# Patient Record
Sex: FEMALE | Race: WHITE | Employment: FULL TIME | URBAN - METROPOLITAN AREA
[De-identification: names, ages, dates, MRNs, and addresses within clinical notes are randomized per-mention and may not be internally consistent; named-entity substitution may affect disease eponyms.]

---

## 2019-05-04 ENCOUNTER — LABORATORY ENCOUNTER (OUTPATIENT)
Dept: LAB | Age: 49
End: 2019-05-04
Attending: FAMILY MEDICINE
Payer: COMMERCIAL

## 2019-05-04 ENCOUNTER — OFFICE VISIT (OUTPATIENT)
Dept: FAMILY MEDICINE CLINIC | Facility: CLINIC | Age: 49
End: 2019-05-04
Payer: COMMERCIAL

## 2019-05-04 VITALS
HEART RATE: 72 BPM | RESPIRATION RATE: 16 BRPM | HEIGHT: 62 IN | DIASTOLIC BLOOD PRESSURE: 90 MMHG | SYSTOLIC BLOOD PRESSURE: 160 MMHG | TEMPERATURE: 99 F | BODY MASS INDEX: 29.9 KG/M2 | WEIGHT: 162.5 LBS

## 2019-05-04 DIAGNOSIS — Z00.00 ANNUAL PHYSICAL EXAM: ICD-10-CM

## 2019-05-04 DIAGNOSIS — Z00.00 ANNUAL PHYSICAL EXAM: Primary | ICD-10-CM

## 2019-05-04 DIAGNOSIS — R73.03 PREDIABETES: ICD-10-CM

## 2019-05-04 DIAGNOSIS — Z13.31 NEGATIVE DEPRESSION SCREENING: ICD-10-CM

## 2019-05-04 DIAGNOSIS — Z12.39 BREAST CANCER SCREENING: ICD-10-CM

## 2019-05-04 DIAGNOSIS — Z23 NEED FOR VACCINATION: ICD-10-CM

## 2019-05-04 DIAGNOSIS — I10 ESSENTIAL HYPERTENSION: ICD-10-CM

## 2019-05-04 DIAGNOSIS — M79.7 FIBROMYALGIA: ICD-10-CM

## 2019-05-04 PROCEDURE — 80053 COMPREHEN METABOLIC PANEL: CPT

## 2019-05-04 PROCEDURE — 90715 TDAP VACCINE 7 YRS/> IM: CPT | Performed by: FAMILY MEDICINE

## 2019-05-04 PROCEDURE — 83036 HEMOGLOBIN GLYCOSYLATED A1C: CPT

## 2019-05-04 PROCEDURE — 85025 COMPLETE CBC W/AUTO DIFF WBC: CPT

## 2019-05-04 PROCEDURE — 99386 PREV VISIT NEW AGE 40-64: CPT | Performed by: FAMILY MEDICINE

## 2019-05-04 PROCEDURE — 36415 COLL VENOUS BLD VENIPUNCTURE: CPT

## 2019-05-04 PROCEDURE — 90472 IMMUNIZATION ADMIN EACH ADD: CPT | Performed by: FAMILY MEDICINE

## 2019-05-04 PROCEDURE — 84443 ASSAY THYROID STIM HORMONE: CPT

## 2019-05-04 PROCEDURE — 80061 LIPID PANEL: CPT

## 2019-05-04 PROCEDURE — 99213 OFFICE O/P EST LOW 20 MIN: CPT | Performed by: FAMILY MEDICINE

## 2019-05-04 PROCEDURE — 90471 IMMUNIZATION ADMIN: CPT | Performed by: FAMILY MEDICINE

## 2019-05-04 RX ORDER — PREGABALIN 150 MG/1
150 CAPSULE ORAL 2 TIMES DAILY
Qty: 180 CAPSULE | Refills: 3 | Status: SHIPPED | OUTPATIENT
Start: 2019-05-04 | End: 2019-12-21

## 2019-05-04 RX ORDER — LISINOPRIL 10 MG/1
10 TABLET ORAL DAILY
Qty: 30 TABLET | Refills: 1 | Status: SHIPPED | OUTPATIENT
Start: 2019-05-04 | End: 2019-06-01

## 2019-05-04 RX ORDER — PREGABALIN 150 MG/1
150 CAPSULE ORAL 2 TIMES DAILY
COMMUNITY
End: 2019-05-04

## 2019-05-04 NOTE — PATIENT INSTRUCTIONS
Thank you for allowing me to participate in your care today. I will contact you with any results from today's visit. Lab results are typically available in 2-3 days for blood tests, and 3-5 days for any cultures or Paps.    Please let me know if you hav instead of salt when you cook. ¨ Request no added salt when you go to a restaurant.   ¨ The American Heart Association (AHA) says the \"ideal\" amount of sodium is no more than 1,500 mg a day.  But because Americans eat so much salt, you can make a [place] [facility] [place] [address]. All rights reserved. This information is not intended as a substitute for professional medical care. Always follow your healthcare professional's instructions.           Prevention Guidelines, Women Ages 36 to 52  Scre every 3 years or Pap test plus human papilloma virus (HPV) test every 5 years   Chlamydia Women at increased risk for infection At routine exams if you're at risk or have symptoms   Depression All women in this age group At routine exams   Gonorrhea Sexual rubella (MMR) All women in this age group who have no record of these infections or vaccines 1 or 2 doses   Meningococcal Women at increased risk for infection–talk with your healthcare provider 1 or more doses   Pneumococcal conjugate vaccine (PCV13) and

## 2019-05-04 NOTE — PROGRESS NOTES
Chantel Pinto is a 51year old female that presents for annual physical exam.     Last Pap: 2018 with Dr. Tamika Swift in [facility], recommended every 3 years  Hx of abnormal pap: No  STI testing desired: No  Mammogram: 2016?  [facility]  Colonoscopy: N/A  PHQ2: Not on file      Number of children: 3      Years of education: Not on file      Highest education level: Not on file    Occupational History      Not on file    Social Needs      Financial resource strain: Not on file      Food insecurity:        Worry: N vision  HEENT: denies nasal congestion, sinus pain or ST  LUNGS: denies shortness of breath with exertion  CARDIOVASCULAR: denies chest pain on exertion  GI: denies abdominal pain, denies heartburn  : denies dysuria, vaginal discharge or itching, periods Breast cancer screening  - [facility] SARAH 2D+3D SCREENING BILAT (CPT=77067/33056); Future    4.  Need for vaccination  - IMMUNIZATION ADMINISTRATION  - EACH ADDITIONAL VACCINE  - TETANUS, DIPHTHERIA TOXOIDS AND ACELLULAR PERTUSIS VACCINE (TDAP), >7 YEARS, IM USE

## 2019-05-06 ENCOUNTER — TELEPHONE (OUTPATIENT)
Dept: FAMILY MEDICINE CLINIC | Facility: CLINIC | Age: 49
End: 2019-05-06

## 2019-05-06 NOTE — PROGRESS NOTES
Faxed RX for Pregabalin per Dr. Hugo Single approval  to [facility] [phone] with confirmation received.

## 2019-05-10 NOTE — TELEPHONE ENCOUNTER
Per [facility], medication Lyrica has been approved for coverage until 5/6/20. [facility] pharmacy was called, Ryan Carrera, and notified medication has been approved.

## 2019-05-28 ENCOUNTER — HOSPITAL ENCOUNTER (OUTPATIENT)
Dept: MAMMOGRAPHY | Age: 49
Discharge: HOME | End: 2019-05-28
Attending: FAMILY MEDICINE
Payer: COMMERCIAL

## 2019-05-28 DIAGNOSIS — Z12.39 BREAST CANCER SCREENING: ICD-10-CM

## 2019-05-28 PROCEDURE — 77063 BREAST TOMOSYNTHESIS BI: CPT | Performed by: FAMILY MEDICINE

## 2019-05-28 PROCEDURE — 77067 SCR MAMMO BI INCL CAD: CPT | Performed by: FAMILY MEDICINE

## 2019-06-01 ENCOUNTER — OFFICE VISIT (OUTPATIENT)
Dept: FAMILY MEDICINE CLINIC | Facility: CLINIC | Age: 49
End: 2019-06-01
Payer: COMMERCIAL

## 2019-06-01 VITALS
WEIGHT: 159 LBS | HEART RATE: 70 BPM | DIASTOLIC BLOOD PRESSURE: 70 MMHG | OXYGEN SATURATION: 99 % | BODY MASS INDEX: 29.26 KG/M2 | HEIGHT: 62 IN | SYSTOLIC BLOOD PRESSURE: 110 MMHG | TEMPERATURE: 98 F | RESPIRATION RATE: 16 BRPM

## 2019-06-01 DIAGNOSIS — I10 ESSENTIAL HYPERTENSION: ICD-10-CM

## 2019-06-01 PROCEDURE — 99213 OFFICE O/P EST LOW 20 MIN: CPT | Performed by: FAMILY MEDICINE

## 2019-06-01 RX ORDER — LISINOPRIL 10 MG/1
10 TABLET ORAL DAILY
Qty: 90 TABLET | Refills: 3 | Status: SHIPPED | OUTPATIENT
Start: 2019-06-01 | End: 2020-07-27

## 2019-06-01 NOTE — PROGRESS NOTES
HPI:   Ilana Jansen is a 51year old female that presents for blood pressure follow up, started on Lisinopril at last visit and bp has normalized. Patient is doing well on medication and denies side effects.   She has been working on [employer] and has lost cyanosis, 2+ radial pulses b/l. NEURO:  Grossly normal     ASSESSMENT AND PLAN:      1. Essential hypertension  - lisinopril 10 MG Oral Tab; Take 1 tablet (10 mg total) by mouth daily. Dispense: 90 tablet;  Refill: 3      Risks, benefits, and alternative

## 2019-06-01 NOTE — PATIENT INSTRUCTIONS
Eating Heart-Healthy Food: Using the [address] for your heart doesn’t have to be hard or boring. You just need to know how to make healthier choices. The DASH eating plan has been developed to help you do just that.  DASH stands for Dietary Approa · 1 egg  Best choices: Lean poultry and fish. Trim away visible fat. Broil, grill, roast, or boil instead of frying. Remove skin from poultry before eating.  Limit how much red meat you eat.  Nuts, seeds, beans  Servings: 4 to 5 a week  A serving is:  · One

## 2019-07-24 ENCOUNTER — OFFICE VISIT (OUTPATIENT)
Dept: FAMILY MEDICINE CLINIC | Facility: CLINIC | Age: 49
End: 2019-07-24
Payer: COMMERCIAL

## 2019-07-24 VITALS
HEIGHT: 62 IN | WEIGHT: 159 LBS | DIASTOLIC BLOOD PRESSURE: 80 MMHG | BODY MASS INDEX: 29.26 KG/M2 | RESPIRATION RATE: 16 BRPM | HEART RATE: 72 BPM | SYSTOLIC BLOOD PRESSURE: 130 MMHG | TEMPERATURE: 98 F

## 2019-07-24 DIAGNOSIS — M54.32 BILATERAL SCIATICA: Primary | ICD-10-CM

## 2019-07-24 DIAGNOSIS — G62.9 NEUROPATHY: ICD-10-CM

## 2019-07-24 DIAGNOSIS — M54.31 BILATERAL SCIATICA: Primary | ICD-10-CM

## 2019-07-24 PROCEDURE — 99214 OFFICE O/P EST MOD 30 MIN: CPT | Performed by: FAMILY MEDICINE

## 2019-07-24 RX ORDER — CYCLOBENZAPRINE HCL 10 MG
10 TABLET ORAL NIGHTLY PRN
Qty: 15 TABLET | Refills: 0 | Status: SHIPPED | OUTPATIENT
Start: 2019-07-24 | End: 2019-12-21 | Stop reason: ALTCHOICE

## 2019-07-24 NOTE — PATIENT INSTRUCTIONS
Pain  Alternate Tylenol and ibuprofen as needed for pain and inflammation  Can add muscle relaxer cyclobenzaprine at night only as needed for pain. This medication can be sedating and habit forming. Do not combine with alcohol.   Biofreeze and heating pad Most of the time mechanical problems with the muscles or spine cause the pain. it is usually caused by an injury, whether known or not, to the muscles or ligaments. While illnesses can cause back pain, it is usually not caused by a serious illness.  Pain is · You can alternate ice and heat therapies. Talk with your healthcare provider about the best treatment for your back or neck pain. As a safety precaution, do not use a heating pad at bedtime.  Sleeping with a heating pad can lead to skin burns or tissue da © 3687-3230 [employer] [address]. [address] [facility] [place] [facility] [address] [place]. All rights reserved. This information is not intended as a substitute for professional medical care. Always follow your healthcare professional's instructions.

## 2019-07-24 NOTE — PROGRESS NOTES
HPI:   Ilana Jansen is a 51year old female that presents for right leg pain, intermittent for 4-5 days. Felt sharp pain in her buttock that shoots down the leg. Feels tingling and painful to get in and out of car.   Started on R side then also happene no skin lesion, no bruising, good turgor. HEART:  Regular rate and rhythm, no murmurs, rubs or gallops. LUNGS: Clear to auscultation bilterally, no rales/rhonchi/wheezing.   BACK: full ROM, no spinous process tenderness, +SLR b/l   EXTREMITIES:  No edema,

## 2019-07-27 ENCOUNTER — APPOINTMENT (OUTPATIENT)
Dept: LAB | Age: 49
End: 2019-07-27
Attending: FAMILY MEDICINE
Payer: COMMERCIAL

## 2019-07-27 LAB
ANION GAP SERPL CALC-SCNC: 6 MMOL/L (ref 0–18)
BUN BLD-MCNC: 15 MG/DL (ref 7–18)
BUN/CREAT SERPL: 18.5 (ref 10–20)
CALCIUM BLD-MCNC: 8.8 MG/DL (ref 8.5–10.1)
CHLORIDE SERPL-SCNC: 106 MMOL/L (ref 98–112)
CO2 SERPL-SCNC: 28 MMOL/L (ref 21–32)
CREAT BLD-MCNC: 0.81 MG/DL (ref 0.55–1.02)
GLUCOSE BLD-MCNC: 104 MG/DL (ref 70–99)
HAV IGM SER QL: 2.4 MG/DL (ref 1.6–2.6)
OSMOLALITY SERPL CALC.SUM OF ELEC: 291 MOSM/KG (ref 275–295)
POTASSIUM SERPL-SCNC: 4.4 MMOL/L (ref 3.5–5.1)
SODIUM SERPL-SCNC: 140 MMOL/L (ref 136–145)

## 2019-12-10 ENCOUNTER — TELEPHONE (OUTPATIENT)
Dept: FAMILY MEDICINE CLINIC | Facility: CLINIC | Age: 49
End: 2019-12-10

## 2019-12-10 DIAGNOSIS — R73.03 PREDIABETES: ICD-10-CM

## 2019-12-10 DIAGNOSIS — I10 ESSENTIAL HYPERTENSION: Primary | ICD-10-CM

## 2019-12-10 DIAGNOSIS — E78.2 MIXED HYPERLIPIDEMIA: ICD-10-CM

## 2019-12-10 DIAGNOSIS — Z51.81 ENCOUNTER FOR THERAPEUTIC DRUG MONITORING: ICD-10-CM

## 2019-12-10 NOTE — TELEPHONE ENCOUNTER
Future Appointments   Date Time Provider Angelica Bonilla   12/21/2019 11:30 AM Danica Mancera, DO [facility] [address] [facility] [address]     Patient has been notified via mychart reminder her to have her labs done 1-2 days prior to appt.     CMP pended for review/approval
yellow

## 2019-12-21 ENCOUNTER — APPOINTMENT (OUTPATIENT)
Dept: LAB | Age: 49
End: 2019-12-21
Attending: FAMILY MEDICINE
Payer: COMMERCIAL

## 2019-12-21 ENCOUNTER — OFFICE VISIT (OUTPATIENT)
Dept: FAMILY MEDICINE CLINIC | Facility: CLINIC | Age: 49
End: 2019-12-21
Payer: COMMERCIAL

## 2019-12-21 VITALS
HEART RATE: 72 BPM | HEIGHT: 62 IN | WEIGHT: 155.25 LBS | TEMPERATURE: 98 F | SYSTOLIC BLOOD PRESSURE: 134 MMHG | BODY MASS INDEX: 28.57 KG/M2 | DIASTOLIC BLOOD PRESSURE: 76 MMHG | RESPIRATION RATE: 16 BRPM

## 2019-12-21 DIAGNOSIS — J04.0 LARYNGITIS: ICD-10-CM

## 2019-12-21 DIAGNOSIS — I10 ESSENTIAL HYPERTENSION: Primary | ICD-10-CM

## 2019-12-21 DIAGNOSIS — N93.9 ABNORMAL UTERINE BLEEDING (AUB): ICD-10-CM

## 2019-12-21 PROCEDURE — 99214 OFFICE O/P EST MOD 30 MIN: CPT | Performed by: FAMILY MEDICINE

## 2019-12-21 RX ORDER — PREDNISONE 20 MG/1
20 TABLET ORAL DAILY
Qty: 5 TABLET | Refills: 0 | Status: SHIPPED | OUTPATIENT
Start: 2019-12-21 | End: 2019-12-26

## 2019-12-21 RX ORDER — AZITHROMYCIN 250 MG/1
TABLET, FILM COATED ORAL
Qty: 6 TABLET | Refills: 0 | Status: SHIPPED | OUTPATIENT
Start: 2019-12-21 | End: 2020-07-27 | Stop reason: ALTCHOICE

## 2019-12-21 NOTE — PROGRESS NOTES
HPI:   Mary Lees is a 51year old female that presents for medication management. HTN on lisinopril, but did not take today. BP borderline in office. Denies medication side effects.       Patient c/o nasal congestion, fatigue, hoarse voice for 1 wee Weight as of this encounter: 155 lb 4 oz (70.4 kg). Vital signs reviewed. Appears stated age, well groomed. Physical Exam:  GEN:  Patient is alert, awake and oriented, well developed, well nourished, no apparent distress.   HEENT:     Head:  Normocephalic needed.     Aguila Persaud, DO  Family Medicine   12/21/2019  11:27 AM

## 2019-12-21 NOTE — PATIENT INSTRUCTIONS
Restart lisinopril and take every day for blood pressure. Goal < 140/90    Prednisone for cough/congestion. If not improving in 10-14 days or fever developing, can start zpak antibiotic.       Cough: mucinex DM twice a day (guifeniasin and dextromethorpha · You may use acetaminophen or ibuprofen to control pain and fever, unless another medicine was prescribed. If you have chronic liver or kidney disease, have ever had a stomach ulcer or gastrointestinal bleeding, or are taking blood-thinning medicines, michelle

## 2020-07-22 ENCOUNTER — TELEPHONE (OUTPATIENT)
Dept: FAMILY MEDICINE CLINIC | Facility: CLINIC | Age: 50
End: 2020-07-22

## 2020-07-22 DIAGNOSIS — R73.03 PREDIABETES: ICD-10-CM

## 2020-07-22 DIAGNOSIS — E78.2 MIXED HYPERLIPIDEMIA: ICD-10-CM

## 2020-07-22 DIAGNOSIS — I10 ESSENTIAL HYPERTENSION: Primary | ICD-10-CM

## 2020-07-22 NOTE — TELEPHONE ENCOUNTER
Future Appointments   Date Time Provider Angelica Bonilla   7/27/2020  5:00 PM Cathleen Mancera, DO [facility] [address] [facility] [address]     Patient has been notified via [employer] reminder her to have her labs done 1-2 days prior to appt. Labs pended for review.

## 2020-07-27 ENCOUNTER — OFFICE VISIT (OUTPATIENT)
Dept: FAMILY MEDICINE CLINIC | Facility: CLINIC | Age: 50
End: 2020-07-27
Payer: COMMERCIAL

## 2020-07-27 VITALS
SYSTOLIC BLOOD PRESSURE: 130 MMHG | RESPIRATION RATE: 16 BRPM | WEIGHT: 155 LBS | HEIGHT: 62 IN | TEMPERATURE: 98 F | DIASTOLIC BLOOD PRESSURE: 84 MMHG | BODY MASS INDEX: 28.52 KG/M2 | HEART RATE: 88 BPM

## 2020-07-27 DIAGNOSIS — I10 ESSENTIAL HYPERTENSION: ICD-10-CM

## 2020-07-27 DIAGNOSIS — Z12.31 BREAST CANCER SCREENING BY MAMMOGRAM: ICD-10-CM

## 2020-07-27 DIAGNOSIS — Z00.00 ANNUAL PHYSICAL EXAM: Primary | ICD-10-CM

## 2020-07-27 DIAGNOSIS — R73.03 PREDIABETES: ICD-10-CM

## 2020-07-27 DIAGNOSIS — Z13.31 NEGATIVE DEPRESSION SCREENING: ICD-10-CM

## 2020-07-27 PROCEDURE — 99213 OFFICE O/P EST LOW 20 MIN: CPT | Performed by: FAMILY MEDICINE

## 2020-07-27 PROCEDURE — 99396 PREV VISIT EST AGE 40-64: CPT | Performed by: FAMILY MEDICINE

## 2020-07-27 PROCEDURE — 3079F DIAST BP 80-89 MM HG: CPT | Performed by: FAMILY MEDICINE

## 2020-07-27 PROCEDURE — 3075F SYST BP GE 130 - 139MM HG: CPT | Performed by: FAMILY MEDICINE

## 2020-07-27 PROCEDURE — 3008F BODY MASS INDEX DOCD: CPT | Performed by: FAMILY MEDICINE

## 2020-07-27 RX ORDER — LISINOPRIL 10 MG/1
10 TABLET ORAL DAILY
Qty: 90 TABLET | Refills: 1 | Status: SHIPPED | OUTPATIENT
Start: 2020-07-27 | End: 2021-08-25

## 2020-07-27 NOTE — PROGRESS NOTES
Evy Martínez is a 53year old female that presents for annual physical exam.     Last Pap: Pap Smear,3 Years due on 07/06/2021  Hx of abnormal pap: no  STI testing desired: no  Mammogram: Mammogram due on 05/28/2020  PHQ2: 0  Vaccines: tdap 2019  Diet a Social Needs      Financial resource strain: Not on file      Food insecurity:        Worry: Not on file        Inability: Not on file      Transportation needs:        Medical: Not on file        Non-medical: Not on file    Tobacco Use      Smoking status exertion  GI: denies abdominal pain, denies heartburn  : denies dysuria, vaginal discharge or itching, periods irregular   MUSCULOSKELETAL: denies back pain  NEURO: denies headaches  PSYCHE: denies depression or anxiety  HEMATOLOGIC: denies hx of anemia Refill: 1    5. Prediabetes  - diet controlled, a1c pending     Risks, benefits, and alternatives of current treatment plan discussed in detail. Questions and concerns addressed. Red flags to RTC or ED reviewed. Patient (or parent) agrees to plan.       R

## 2020-07-27 NOTE — PATIENT INSTRUCTIONS
Blood Pressure  Restart lisinopril 10 mg daily  Check blood pressure 1-2 times a week at home, goal < 140/90  Follow up in 6 months    Please gets labs done at [facility]. [phone]      Thank you for allowing me to pa provider   Breast cancer All women at average risk in this age group Screening with a mammogram can start at age 40.1 Talk with your healthcare provider to help you decide when to start screening. At age 39 start yearly mammograms. 3    Cervical cancer All 6 months; second dose should be given 1 month after the first dose; the third dose should be given at least 2 months after the second dose and at least 4 months after the first dose   Haemophilus influenzae Type B (HIB) Women at increased risk 1 to 3 doses Always follow your healthcare professional's instructions.

## 2020-08-15 ENCOUNTER — APPOINTMENT (OUTPATIENT)
Dept: LAB | Age: 50
End: 2020-08-15
Attending: FAMILY MEDICINE
Payer: COMMERCIAL

## 2020-08-15 ENCOUNTER — HOSPITAL ENCOUNTER (OUTPATIENT)
Dept: MAMMOGRAPHY | Age: 50
Discharge: HOME | End: 2020-08-15
Attending: FAMILY MEDICINE
Payer: COMMERCIAL

## 2020-08-15 DIAGNOSIS — Z12.31 BREAST CANCER SCREENING BY MAMMOGRAM: ICD-10-CM

## 2020-08-15 LAB
ALBUMIN SERPL-MCNC: 3.2 G/DL (ref 3.4–5)
ALBUMIN/GLOB SERPL: 0.8 {RATIO} (ref 1–2)
ALP LIVER SERPL-CCNC: 67 U/L (ref 39–100)
ALT SERPL-CCNC: 31 U/L (ref 13–56)
ANION GAP SERPL CALC-SCNC: 3 MMOL/L (ref 0–18)
AST SERPL-CCNC: 15 U/L (ref 15–37)
BASOPHILS # BLD AUTO: 0.05 X10(3) UL (ref 0–0.2)
BASOPHILS NFR BLD AUTO: 0.7 %
BILIRUB SERPL-MCNC: 0.3 MG/DL (ref 0.1–2)
BUN BLD-MCNC: 15 MG/DL (ref 7–18)
BUN/CREAT SERPL: 22.1 (ref 10–20)
CALCIUM BLD-MCNC: 8.8 MG/DL (ref 8.5–10.1)
CHLORIDE SERPL-SCNC: 106 MMOL/L (ref 98–112)
CHOLEST SMN-MCNC: 283 MG/DL (ref ?–200)
CO2 SERPL-SCNC: 28 MMOL/L (ref 21–32)
CREAT BLD-MCNC: 0.68 MG/DL (ref 0.55–1.02)
DEPRECATED RDW RBC AUTO: 46.3 FL (ref 35.1–46.3)
EOSINOPHIL # BLD AUTO: 0.3 X10(3) UL (ref 0–0.7)
EOSINOPHIL NFR BLD AUTO: 4.3 %
ERYTHROCYTE [DISTWIDTH] IN BLOOD BY AUTOMATED COUNT: 13.4 % (ref 11–15)
EST. AVERAGE GLUCOSE BLD GHB EST-MCNC: 123 MG/DL (ref 68–126)
GLOBULIN PLAS-MCNC: 3.9 G/DL (ref 2.8–4.4)
GLUCOSE BLD-MCNC: 101 MG/DL (ref 70–99)
HBA1C MFR BLD HPLC: 5.9 % (ref ?–5.7)
HCT VFR BLD AUTO: 43.5 % (ref 35–48)
HDLC SERPL-MCNC: 57 MG/DL (ref 40–59)
HGB BLD-MCNC: 13.7 G/DL (ref 12–16)
IMM GRANULOCYTES # BLD AUTO: 0.01 X10(3) UL (ref 0–1)
IMM GRANULOCYTES NFR BLD: 0.1 %
LDLC SERPL CALC-MCNC: 199 MG/DL (ref ?–100)
LYMPHOCYTES # BLD AUTO: 2.89 X10(3) UL (ref 1–4)
LYMPHOCYTES NFR BLD AUTO: 41.3 %
M PROTEIN MFR SERPL ELPH: 7.1 G/DL (ref 6.4–8.2)
MCH RBC QN AUTO: 29.7 PG (ref 26–34)
MCHC RBC AUTO-ENTMCNC: 31.5 G/DL (ref 31–37)
MCV RBC AUTO: 94.4 FL (ref 80–100)
MONOCYTES # BLD AUTO: 0.66 X10(3) UL (ref 0.1–1)
MONOCYTES NFR BLD AUTO: 9.4 %
NEUTROPHILS # BLD AUTO: 3.08 X10 (3) UL (ref 1.5–7.7)
NEUTROPHILS # BLD AUTO: 3.08 X10(3) UL (ref 1.5–7.7)
NEUTROPHILS NFR BLD AUTO: 44.2 %
NONHDLC SERPL-MCNC: 226 MG/DL (ref ?–130)
OSMOLALITY SERPL CALC.SUM OF ELEC: 285 MOSM/KG (ref 275–295)
PATIENT FASTING Y/N/NP: YES
PATIENT FASTING Y/N/NP: YES
PLATELET # BLD AUTO: 232 10(3)UL (ref 150–450)
POTASSIUM SERPL-SCNC: 4 MMOL/L (ref 3.5–5.1)
RBC # BLD AUTO: 4.61 X10(6)UL (ref 3.8–5.3)
SODIUM SERPL-SCNC: 137 MMOL/L (ref 136–145)
TRIGL SERPL-MCNC: 134 MG/DL (ref 30–149)
TSI SER-ACNC: 1.65 MIU/ML (ref 0.36–3.74)
VLDLC SERPL CALC-MCNC: 27 MG/DL (ref 0–30)
WBC # BLD AUTO: 7 X10(3) UL (ref 4–11)

## 2020-08-15 PROCEDURE — 85025 COMPLETE CBC W/AUTO DIFF WBC: CPT | Performed by: FAMILY MEDICINE

## 2020-08-15 PROCEDURE — 80061 LIPID PANEL: CPT | Performed by: FAMILY MEDICINE

## 2020-08-15 PROCEDURE — 83036 HEMOGLOBIN GLYCOSYLATED A1C: CPT | Performed by: FAMILY MEDICINE

## 2020-08-15 PROCEDURE — 80053 COMPREHEN METABOLIC PANEL: CPT | Performed by: FAMILY MEDICINE

## 2020-08-15 PROCEDURE — 77067 SCR MAMMO BI INCL CAD: CPT | Performed by: FAMILY MEDICINE

## 2020-08-15 PROCEDURE — 36415 COLL VENOUS BLD VENIPUNCTURE: CPT | Performed by: FAMILY MEDICINE

## 2020-08-15 PROCEDURE — 84443 ASSAY THYROID STIM HORMONE: CPT | Performed by: FAMILY MEDICINE

## 2020-08-15 PROCEDURE — 77063 BREAST TOMOSYNTHESIS BI: CPT | Performed by: FAMILY MEDICINE

## 2021-06-23 ENCOUNTER — MED REC SCAN ONLY (OUTPATIENT)
Dept: FAMILY MEDICINE CLINIC | Facility: CLINIC | Age: 51
End: 2021-06-23

## 2021-08-25 ENCOUNTER — OFFICE VISIT (OUTPATIENT)
Dept: FAMILY MEDICINE CLINIC | Facility: CLINIC | Age: 51
End: 2021-08-25
Payer: COMMERCIAL

## 2021-08-25 VITALS
HEIGHT: 62 IN | DIASTOLIC BLOOD PRESSURE: 68 MMHG | WEIGHT: 162 LBS | HEART RATE: 58 BPM | BODY MASS INDEX: 29.81 KG/M2 | RESPIRATION RATE: 16 BRPM | SYSTOLIC BLOOD PRESSURE: 116 MMHG | TEMPERATURE: 98 F

## 2021-08-25 DIAGNOSIS — L70.0 CYSTIC ACNE: ICD-10-CM

## 2021-08-25 DIAGNOSIS — I10 ESSENTIAL HYPERTENSION: ICD-10-CM

## 2021-08-25 DIAGNOSIS — Z11.51 SCREENING FOR HUMAN PAPILLOMAVIRUS (HPV): ICD-10-CM

## 2021-08-25 DIAGNOSIS — R73.03 PREDIABETES: ICD-10-CM

## 2021-08-25 DIAGNOSIS — Z00.00 ANNUAL PHYSICAL EXAM: Primary | ICD-10-CM

## 2021-08-25 DIAGNOSIS — Z12.4 CERVICAL CANCER SCREENING: ICD-10-CM

## 2021-08-25 DIAGNOSIS — Z23 NEED FOR VACCINATION: ICD-10-CM

## 2021-08-25 LAB
CARTRIDGE EXPIRATION DATE: ABNORMAL DATE
CARTRIDGE LOT#: 815 NUMERIC
HEMOGLOBIN A1C: 6 % (ref 4.3–5.6)

## 2021-08-25 PROCEDURE — 83036 HEMOGLOBIN GLYCOSYLATED A1C: CPT | Performed by: FAMILY MEDICINE

## 2021-08-25 PROCEDURE — 88175 CYTOPATH C/V AUTO FLUID REDO: CPT | Performed by: FAMILY MEDICINE

## 2021-08-25 PROCEDURE — 3008F BODY MASS INDEX DOCD: CPT | Performed by: FAMILY MEDICINE

## 2021-08-25 PROCEDURE — 3074F SYST BP LT 130 MM HG: CPT | Performed by: FAMILY MEDICINE

## 2021-08-25 PROCEDURE — 90750 HZV VACC RECOMBINANT IM: CPT | Performed by: FAMILY MEDICINE

## 2021-08-25 PROCEDURE — 90471 IMMUNIZATION ADMIN: CPT | Performed by: FAMILY MEDICINE

## 2021-08-25 PROCEDURE — 87624 HPV HI-RISK TYP POOLED RSLT: CPT | Performed by: FAMILY MEDICINE

## 2021-08-25 PROCEDURE — 3078F DIAST BP <80 MM HG: CPT | Performed by: FAMILY MEDICINE

## 2021-08-25 PROCEDURE — 99396 PREV VISIT EST AGE 40-64: CPT | Performed by: FAMILY MEDICINE

## 2021-08-25 PROCEDURE — 99213 OFFICE O/P EST LOW 20 MIN: CPT | Performed by: FAMILY MEDICINE

## 2021-08-25 RX ORDER — LISINOPRIL 10 MG/1
10 TABLET ORAL DAILY
Qty: 90 TABLET | Refills: 1 | Status: SHIPPED | OUTPATIENT
Start: 2021-08-25 | End: 2022-01-31

## 2021-08-25 RX ORDER — FERROUS SULFATE 325(65) MG
325 TABLET ORAL
COMMUNITY

## 2021-08-25 RX ORDER — SULFAMETHOXAZOLE AND TRIMETHOPRIM 800; 160 MG/1; MG/1
1 TABLET ORAL 2 TIMES DAILY
Qty: 6 TABLET | Refills: 0 | Status: SHIPPED | OUTPATIENT
Start: 2021-08-25 | End: 2021-08-28

## 2021-08-25 RX ORDER — ROSUVASTATIN CALCIUM 20 MG/1
TABLET, COATED ORAL
COMMUNITY
Start: 2021-08-22 | End: 2022-01-31

## 2021-08-25 NOTE — PATIENT INSTRUCTIONS
Thank you for allowing me to participate in your care today. I will contact you with any results from today's visit. Lab results are typically available in 2-3 days for blood tests, and 3-5 days for any cultures or Paps.    Please let me know if you hav familiar with the potential benefits and risks of breast cancer screening with mammograms.      Cervical cancer All women in this age group, except women who have had a complete hysterectomy Pap test every 3 years or Pap test with human papillomavirus (HPV your healthcare provider 2 doses given at least 6 months apart   Hepatitis B Women at increased risk for infection – talk with your healthcare provider 3 doses over 6 months; second dose should be given 1 month after the first dose; the third dose should b at risk for cardiovascular health problems such as stroke When your risk is known   Use of tobacco and the health effects it can cause All women in this age group Every exam   [address]  Date Last Reviewed: 1/26/2016  © 8082-1761 [employer]

## 2021-08-25 NOTE — PROGRESS NOTES
Cody Staton is a 49year old female that presents for annual physical exam.     Patient presents with: Annual: routine physical, pend labs to [address] [place] [address] is scheduled for 8/26/2021.  PHQ2:0, CSSR:0  Immunization/Injection: Discuss shingles  Medica History:   Procedure Laterality Date   • Cholecystectomy     • Prior classical        Family History   Problem Relation Age of Onset   • Heart Disorder Father    • Diabetes Father    • Hypertension Father    • Heart Disorder Sister 15   • Seizure Gatherings with Friends and Family:       Attends Je[employer] witness Services:       Active Member of Clubs or Organizations:       Attends Club or Organization Meetings:       Marital Status:   Intimate Partner Violence:       Fear of Current or Ex-Partner:       cervix is pink, no adnexal masses or tenderness    Wt Readings from Last 6 Encounters:  08/25/21 : 162 lb (73.5 kg)  07/27/20 : 155 lb (70.3 kg)  12/21/19 : 155 lb 4 oz (70.4 kg)  07/24/19 : 159 lb (72.1 kg)  06/01/19 : 159 lb (72.1 kg)  05/04/19 : 162 lb

## 2021-08-26 ENCOUNTER — HOSPITAL ENCOUNTER (OUTPATIENT)
Dept: MAMMOGRAPHY | Age: 51
Discharge: HOME | End: 2021-08-26
Attending: FAMILY MEDICINE
Payer: COMMERCIAL

## 2021-08-26 DIAGNOSIS — Z12.31 ENCOUNTER FOR SCREENING MAMMOGRAM FOR MALIGNANT NEOPLASM OF BREAST: ICD-10-CM

## 2021-08-26 LAB — HPV I/H RISK 1 DNA SPEC QL NAA+PROBE: NEGATIVE

## 2021-08-26 PROCEDURE — 77067 SCR MAMMO BI INCL CAD: CPT | Performed by: FAMILY MEDICINE

## 2021-08-26 PROCEDURE — 77063 BREAST TOMOSYNTHESIS BI: CPT | Performed by: FAMILY MEDICINE

## 2021-10-27 ENCOUNTER — NURSE ONLY (OUTPATIENT)
Dept: FAMILY MEDICINE CLINIC | Facility: CLINIC | Age: 51
End: 2021-10-27
Payer: COMMERCIAL

## 2021-10-27 DIAGNOSIS — Z23 NEED FOR VACCINATION: Primary | ICD-10-CM

## 2021-10-27 PROCEDURE — 90472 IMMUNIZATION ADMIN EACH ADD: CPT | Performed by: FAMILY MEDICINE

## 2021-10-27 PROCEDURE — 90686 IIV4 VACC NO PRSV 0.5 ML IM: CPT | Performed by: FAMILY MEDICINE

## 2021-10-27 PROCEDURE — 90471 IMMUNIZATION ADMIN: CPT | Performed by: FAMILY MEDICINE

## 2021-10-27 PROCEDURE — 90750 HZV VACC RECOMBINANT IM: CPT | Performed by: FAMILY MEDICINE

## 2021-12-07 ENCOUNTER — OFFICE VISIT (OUTPATIENT)
Dept: FAMILY MEDICINE CLINIC | Facility: CLINIC | Age: 51
End: 2021-12-07
Payer: COMMERCIAL

## 2021-12-07 VITALS
WEIGHT: 167 LBS | SYSTOLIC BLOOD PRESSURE: 118 MMHG | RESPIRATION RATE: 16 BRPM | DIASTOLIC BLOOD PRESSURE: 74 MMHG | BODY MASS INDEX: 30.73 KG/M2 | OXYGEN SATURATION: 99 % | HEIGHT: 62 IN | HEART RATE: 60 BPM | TEMPERATURE: 98 F

## 2021-12-07 DIAGNOSIS — M79.7 FIBROMYALGIA: ICD-10-CM

## 2021-12-07 DIAGNOSIS — G89.4 CHRONIC PAIN SYNDROME: Primary | ICD-10-CM

## 2021-12-07 PROCEDURE — 99213 OFFICE O/P EST LOW 20 MIN: CPT | Performed by: FAMILY MEDICINE

## 2021-12-07 PROCEDURE — 3078F DIAST BP <80 MM HG: CPT | Performed by: FAMILY MEDICINE

## 2021-12-07 PROCEDURE — 3008F BODY MASS INDEX DOCD: CPT | Performed by: FAMILY MEDICINE

## 2021-12-07 PROCEDURE — 3074F SYST BP LT 130 MM HG: CPT | Performed by: FAMILY MEDICINE

## 2021-12-07 RX ORDER — CHLORAL HYDRATE 500 MG
CAPSULE ORAL
COMMUNITY

## 2021-12-07 RX ORDER — MULTIVITAMIN WITH IRON
250 TABLET ORAL
COMMUNITY

## 2021-12-07 RX ORDER — AMITRIPTYLINE HYDROCHLORIDE 10 MG/1
10 TABLET, FILM COATED ORAL NIGHTLY
Qty: 30 TABLET | Refills: 0 | Status: SHIPPED | OUTPATIENT
Start: 2021-12-07 | End: 2022-01-06

## 2021-12-07 NOTE — PROGRESS NOTES
Note to patient: The [facility] [address] makes medical notes like these available to patients in the interest of transparency. However, be advised this is a medical document. It is intended as peer to peer communication.  It is written in medical languag CLASSICAL        Family History   Problem Relation Age of Onset   • Heart Disorder Father    • Diabetes Father    • Hypertension Father    • Heart Disorder Sister 15   • Seizure Disorder Mother    • Breast Cancer Neg    • Colon Cancer Neg    • Ova person, place, and time. Moderate distress. HEENT:  Normocephalic and atraumatic. Neck: Normal range of motion. Neck supple. No mass and no thyromegaly present. Cardiovascular: Normal rate, regular rhythm and intact distal pulses.   No murmur, rubs or total) by mouth nightly. Imaging & Consults:  RHEUMATOLOGY - INTERNAL      No follow-ups on file. There are no Patient Instructions on file for this visit. All questions were answered and the patient understands the plan.      BARBY Rajan

## 2021-12-08 NOTE — PATIENT INSTRUCTIONS
Amitriptyline Oral Tablet 10 mg  Uses  This medicine is used for the following purposes:  · depression  · eating disorders  · prevent migraine headaches  · pain  · post-traumatic stress disorder  Instructions  Take the medicine with 250 mL (1 cup) of phill include trouble breathing, skin rash, itching, swelling, or severe dizziness. This medicine is associated with an increased risk of serious heart problems, heart attack, and stroke.  Please speak with your doctor about the risks and benefits of using this energy and tiredness  · headaches  · irritability  · problems with sexual functions or desire  · stomach upset or abdominal pain  · increased risk of sunburn  · sweating  · unusual or unexplained tiredness or weakness  · difficulty or discomfort urinating below. You can also ask your pharmacist for a printout.  If you have any questions, please ask your pharmacist.   © 2021 [address]

## 2022-01-25 ENCOUNTER — TELEPHONE (OUTPATIENT)
Dept: FAMILY MEDICINE CLINIC | Facility: CLINIC | Age: 52
End: 2022-01-25

## 2022-01-25 NOTE — TELEPHONE ENCOUNTER
Pt is calling to see if she can get in to see  for her fibromyalgia pain. She says that she's been trying a lot of things to help with it, including meditation etc and nothing is helping. She says she also is experincing  Insomnia.  She gets 2-3 hours a n

## 2022-01-26 NOTE — TELEPHONE ENCOUNTER
Future Appointments   Date Time Provider Angelica Bonilla   1/31/2022  9:30 AM Villa Mancera, DO [facility] [address] [facility] [address]     Patient scheduled appointment to discuss concerns with PCP.

## 2022-01-31 ENCOUNTER — OFFICE VISIT (OUTPATIENT)
Dept: FAMILY MEDICINE CLINIC | Facility: CLINIC | Age: 52
End: 2022-01-31
Payer: COMMERCIAL

## 2022-01-31 VITALS
WEIGHT: 163 LBS | RESPIRATION RATE: 16 BRPM | OXYGEN SATURATION: 98 % | HEIGHT: 62 IN | TEMPERATURE: 98 F | BODY MASS INDEX: 30 KG/M2 | SYSTOLIC BLOOD PRESSURE: 148 MMHG | DIASTOLIC BLOOD PRESSURE: 82 MMHG | HEART RATE: 78 BPM

## 2022-01-31 DIAGNOSIS — M79.7 FIBROMYALGIA: ICD-10-CM

## 2022-01-31 DIAGNOSIS — I10 ESSENTIAL HYPERTENSION: ICD-10-CM

## 2022-01-31 DIAGNOSIS — Z86.16 HISTORY OF COVID-19: Primary | ICD-10-CM

## 2022-01-31 PROCEDURE — 3008F BODY MASS INDEX DOCD: CPT | Performed by: FAMILY MEDICINE

## 2022-01-31 PROCEDURE — 99214 OFFICE O/P EST MOD 30 MIN: CPT | Performed by: FAMILY MEDICINE

## 2022-01-31 PROCEDURE — 3079F DIAST BP 80-89 MM HG: CPT | Performed by: FAMILY MEDICINE

## 2022-01-31 PROCEDURE — 3077F SYST BP >= 140 MM HG: CPT | Performed by: FAMILY MEDICINE

## 2022-01-31 NOTE — PROGRESS NOTES
HPI:   Heather Boyer is a 49year old female. Patient presents with:  Fatigue  Pain    Had covid at end of Dec 2021. Feels her fibromyalgia is more flared up since having it. Gets tired after cleaning her house. She has no sob or cough or fever.   Shirley Balderrama discharge    Ears: External normal. TMs normal without erythema or effusion   Nose: patent, no nasal discharge    Throat:  No tonsillar erythema or exudate. Mouth:  No oral lesions or ulcerations, good dentition.   NECK: Supple, no cervical LAD, no thyr

## 2022-02-01 LAB
ABSOLUTE BASOPHILS: 30 CELLS/UL (ref 0–200)
ABSOLUTE EOSINOPHILS: 255 CELLS/UL (ref 15–500)
ABSOLUTE LYMPHOCYTES: 2555 CELLS/UL (ref 850–3900)
ABSOLUTE MONOCYTES: 355 CELLS/UL (ref 200–950)
ABSOLUTE NEUTROPHILS: 1805 CELLS/UL (ref 1500–7800)
ALBUMIN/GLOBULIN RATIO: 1.6 (CALC) (ref 1–2.5)
ALBUMIN: 4.5 G/DL (ref 3.6–5.1)
ALKALINE PHOSPHATASE: 73 U/L (ref 37–153)
ALT: 40 U/L (ref 6–29)
AST: 22 U/L (ref 10–35)
BASOPHILS: 0.6 %
BILIRUBIN, TOTAL: 0.6 MG/DL (ref 0.2–1.2)
BUN: 16 MG/DL (ref 7–25)
CALCIUM: 9.6 MG/DL (ref 8.6–10.4)
CARBON DIOXIDE: 30 MMOL/L (ref 20–32)
CHLORIDE: 102 MMOL/L (ref 98–110)
CHOL/HDLC RATIO: 5.1 (CALC)
CHOLESTEROL, TOTAL: 337 MG/DL
CREATININE: 0.69 MG/DL (ref 0.5–1.05)
EGFR IF AFRICN AM: 118 ML/MIN/1.73M2
EGFR IF NONAFRICN AM: 102 ML/MIN/1.73M2
EOSINOPHILS: 5.1 %
GLOBULIN: 2.9 G/DL (CALC) (ref 1.9–3.7)
GLUCOSE: 97 MG/DL (ref 65–99)
HDL CHOLESTEROL: 66 MG/DL
HEMATOCRIT: 44.7 % (ref 35–45)
HEMOGLOBIN: 14.5 G/DL (ref 11.7–15.5)
LDL-CHOLESTEROL: 245 MG/DL (CALC)
LYMPHOCYTES: 51.1 %
MCH: 29.2 PG (ref 27–33)
MCHC: 32.4 G/DL (ref 32–36)
MCV: 89.9 FL (ref 80–100)
MONOCYTES: 7.1 %
NEUTROPHILS: 36.1 %
NON-HDL CHOLESTEROL: 271 MG/DL (CALC)
POTASSIUM: 3.9 MMOL/L (ref 3.5–5.3)
PROTEIN, TOTAL: 7.4 G/DL (ref 6.1–8.1)
RDW: 12.9 % (ref 11–15)
RED BLOOD CELL COUNT: 4.97 MILLION/UL (ref 3.8–5.1)
SODIUM: 137 MMOL/L (ref 135–146)
TRIGLYCERIDES: 120 MG/DL
TSH W/REFLEX TO FT4: 2.26 MIU/L
WHITE BLOOD CELL COUNT: 5 THOUSAND/UL (ref 3.8–10.8)

## 2022-03-23 ENCOUNTER — HOSPITAL ENCOUNTER (EMERGENCY)
Age: 52
Discharge: HOME | End: 2022-03-23
Attending: EMERGENCY MEDICINE
Payer: COMMERCIAL

## 2022-03-23 ENCOUNTER — APPOINTMENT (OUTPATIENT)
Dept: GENERAL RADIOLOGY | Age: 52
End: 2022-03-23
Attending: STUDENT IN AN ORGANIZED HEALTH CARE EDUCATION/TRAINING PROGRAM
Payer: COMMERCIAL

## 2022-03-23 ENCOUNTER — TELEPHONE (OUTPATIENT)
Dept: FAMILY MEDICINE CLINIC | Facility: CLINIC | Age: 52
End: 2022-03-23

## 2022-03-23 VITALS
OXYGEN SATURATION: 96 % | WEIGHT: 148 LBS | SYSTOLIC BLOOD PRESSURE: 130 MMHG | DIASTOLIC BLOOD PRESSURE: 82 MMHG | BODY MASS INDEX: 27.23 KG/M2 | TEMPERATURE: 97 F | HEART RATE: 64 BPM | HEIGHT: 62 IN | RESPIRATION RATE: 17 BRPM

## 2022-03-23 DIAGNOSIS — R03.0 ELEVATED BLOOD PRESSURE READING: Primary | ICD-10-CM

## 2022-03-23 DIAGNOSIS — M79.7 FIBROMYALGIA MUSCLE PAIN: ICD-10-CM

## 2022-03-23 LAB
ANION GAP SERPL CALC-SCNC: 5 MMOL/L (ref 0–18)
BASOPHILS # BLD AUTO: 0.03 X10(3) UL (ref 0–0.2)
BASOPHILS NFR BLD AUTO: 0.4 %
BUN BLD-MCNC: 12 MG/DL (ref 7–18)
CALCIUM BLD-MCNC: 9.3 MG/DL (ref 8.5–10.1)
CHLORIDE SERPL-SCNC: 106 MMOL/L (ref 98–112)
CO2 SERPL-SCNC: 28 MMOL/L (ref 21–32)
CREAT BLD-MCNC: 0.7 MG/DL
EOSINOPHIL # BLD AUTO: 0.2 X10(3) UL (ref 0–0.7)
EOSINOPHIL NFR BLD AUTO: 2.9 %
ERYTHROCYTE [DISTWIDTH] IN BLOOD BY AUTOMATED COUNT: 12.7 %
GLUCOSE BLD-MCNC: 113 MG/DL (ref 70–99)
HCT VFR BLD AUTO: 44 %
HGB BLD-MCNC: 14.7 G/DL
IMM GRANULOCYTES # BLD AUTO: 0.01 X10(3) UL (ref 0–1)
IMM GRANULOCYTES NFR BLD: 0.1 %
LYMPHOCYTES # BLD AUTO: 3.24 X10(3) UL (ref 1–4)
LYMPHOCYTES NFR BLD AUTO: 46.8 %
MCH RBC QN AUTO: 29.9 PG (ref 26–34)
MCHC RBC AUTO-ENTMCNC: 33.4 G/DL (ref 31–37)
MCV RBC AUTO: 89.4 FL
MONOCYTES # BLD AUTO: 0.66 X10(3) UL (ref 0.1–1)
MONOCYTES NFR BLD AUTO: 9.5 %
NEUTROPHILS # BLD AUTO: 2.78 X10 (3) UL (ref 1.5–7.7)
NEUTROPHILS # BLD AUTO: 2.78 X10(3) UL (ref 1.5–7.7)
NEUTROPHILS NFR BLD AUTO: 40.3 %
OSMOLALITY SERPL CALC.SUM OF ELEC: 289 MOSM/KG (ref 275–295)
PLATELET # BLD AUTO: 240 10(3)UL (ref 150–450)
POTASSIUM SERPL-SCNC: 3.8 MMOL/L (ref 3.5–5.1)
RBC # BLD AUTO: 4.92 X10(6)UL
SODIUM SERPL-SCNC: 139 MMOL/L (ref 136–145)
TROPONIN I HIGH SENSITIVITY: 5 NG/L

## 2022-03-23 PROCEDURE — 85025 COMPLETE CBC W/AUTO DIFF WBC: CPT | Performed by: STUDENT IN AN ORGANIZED HEALTH CARE EDUCATION/TRAINING PROGRAM

## 2022-03-23 PROCEDURE — 93010 ELECTROCARDIOGRAM REPORT: CPT

## 2022-03-23 PROCEDURE — 84484 ASSAY OF TROPONIN QUANT: CPT | Performed by: STUDENT IN AN ORGANIZED HEALTH CARE EDUCATION/TRAINING PROGRAM

## 2022-03-23 PROCEDURE — 80048 BASIC METABOLIC PNL TOTAL CA: CPT | Performed by: STUDENT IN AN ORGANIZED HEALTH CARE EDUCATION/TRAINING PROGRAM

## 2022-03-23 PROCEDURE — 71046 X-RAY EXAM CHEST 2 VIEWS: CPT | Performed by: STUDENT IN AN ORGANIZED HEALTH CARE EDUCATION/TRAINING PROGRAM

## 2022-03-23 PROCEDURE — 36415 COLL VENOUS BLD VENIPUNCTURE: CPT

## 2022-03-23 PROCEDURE — 93005 ELECTROCARDIOGRAM TRACING: CPT

## 2022-03-23 PROCEDURE — 99284 EMERGENCY DEPT VISIT MOD MDM: CPT

## 2022-03-23 RX ORDER — ATORVASTATIN CALCIUM 10 MG/1
10 TABLET, FILM COATED ORAL NIGHTLY
COMMUNITY
End: 2022-03-28 | Stop reason: ALTCHOICE

## 2022-03-23 RX ORDER — LISINOPRIL 10 MG/1
10 TABLET ORAL DAILY
COMMUNITY

## 2022-03-23 NOTE — TELEPHONE ENCOUNTER
- Pt states BP was very high last night 185/96. Today 136/90. Pt felt a little sore in chest and arm. OK now. Arms feel sore today. Patient has scheduled an appointment with Guevara Oneal next week and would like to know if she should wait be seen?     Future Appointments   Date Time Provider Angelica Bonilla   3/28/2022 12:00 PM Naomi Carrillo, DO [facility] [address]     PH. [phone]

## 2022-03-23 NOTE — TELEPHONE ENCOUNTER
Called pt using [place]  [facility] [address] [facility]. Spoke with pt, states last night she was feeling \"a little funny\" and took her blood pressure which was elevated. Pt states she took a blood pressure pill and her blood readings went up through the night \"starting at 160 and the last blood pressure reading in the night was 185/96 but BP reading this am in 136/90. \" Pt states today her arms \"feel like I worked out to much, my chest has a burning sensation, and my little finger has a pulsing sensation like it was stung. \" Advised pt with these symptoms to be seen in the ER as they could be related to MI, pt verbalized understanding and states she is in [facility] right now but will go to the ER when she returns later today. Reiterated the importance of being seen in the ER with her symptoms pt verbalized understanding and thanked this writer for the care and concern and states she will go to the ER today. Also advised pt to keep scheduled appointment with Dr. Ophelia Easley for follow up, pt agreeable with this plan.

## 2022-03-24 LAB
ATRIAL RATE: 67 BPM
P AXIS: 41 DEGREES
P-R INTERVAL: 104 MS
Q-T INTERVAL: 406 MS
QRS DURATION: 94 MS
QTC CALCULATION (BEZET): 429 MS
R AXIS: 61 DEGREES
T AXIS: 51 DEGREES
VENTRICULAR RATE: 67 BPM

## 2022-03-24 NOTE — ED INITIAL ASSESSMENT (HPI)
Pt presents to ed with c/o high blood pressure. Pt states she felt tightness to her chest and L arm so she went to [facility] to check her blood pressure and got a reading of 157/99. Pt states she has been taking her medication only if her blood pressures are high. Also reports tingling to her L hand.

## 2022-03-28 ENCOUNTER — OFFICE VISIT (OUTPATIENT)
Dept: FAMILY MEDICINE CLINIC | Facility: CLINIC | Age: 52
End: 2022-03-28
Payer: COMMERCIAL

## 2022-03-28 VITALS
RESPIRATION RATE: 14 BRPM | TEMPERATURE: 98 F | DIASTOLIC BLOOD PRESSURE: 78 MMHG | HEIGHT: 62 IN | BODY MASS INDEX: 30.55 KG/M2 | WEIGHT: 166 LBS | OXYGEN SATURATION: 94 % | SYSTOLIC BLOOD PRESSURE: 130 MMHG | HEART RATE: 78 BPM

## 2022-03-28 DIAGNOSIS — R73.03 PREDIABETES: ICD-10-CM

## 2022-03-28 DIAGNOSIS — E78.2 MIXED HYPERLIPIDEMIA: ICD-10-CM

## 2022-03-28 DIAGNOSIS — I10 ESSENTIAL HYPERTENSION: Primary | ICD-10-CM

## 2022-03-28 PROCEDURE — 3075F SYST BP GE 130 - 139MM HG: CPT | Performed by: FAMILY MEDICINE

## 2022-03-28 PROCEDURE — 3008F BODY MASS INDEX DOCD: CPT | Performed by: FAMILY MEDICINE

## 2022-03-28 PROCEDURE — 99213 OFFICE O/P EST LOW 20 MIN: CPT | Performed by: FAMILY MEDICINE

## 2022-03-28 PROCEDURE — 3078F DIAST BP <80 MM HG: CPT | Performed by: FAMILY MEDICINE

## 2022-03-28 RX ORDER — ROSUVASTATIN CALCIUM 20 MG/1
20 TABLET, COATED ORAL NIGHTLY
COMMUNITY

## 2022-09-28 ENCOUNTER — OFFICE VISIT (OUTPATIENT)
Dept: FAMILY MEDICINE CLINIC | Facility: CLINIC | Age: 52
End: 2022-09-28

## 2022-09-28 VITALS
BODY MASS INDEX: 30.18 KG/M2 | DIASTOLIC BLOOD PRESSURE: 84 MMHG | HEART RATE: 64 BPM | SYSTOLIC BLOOD PRESSURE: 122 MMHG | HEIGHT: 62 IN | TEMPERATURE: 97 F | WEIGHT: 164 LBS | OXYGEN SATURATION: 98 % | RESPIRATION RATE: 14 BRPM

## 2022-09-28 DIAGNOSIS — Z00.00 ANNUAL PHYSICAL EXAM: Primary | ICD-10-CM

## 2022-09-28 DIAGNOSIS — Z12.11 SCREENING FOR COLON CANCER: ICD-10-CM

## 2022-09-28 DIAGNOSIS — Z23 NEED FOR VACCINATION: ICD-10-CM

## 2022-09-28 DIAGNOSIS — Z13.31 NEGATIVE DEPRESSION SCREENING: ICD-10-CM

## 2022-09-28 DIAGNOSIS — Z12.31 SCREENING MAMMOGRAM FOR BREAST CANCER: ICD-10-CM

## 2022-09-28 PROCEDURE — 90471 IMMUNIZATION ADMIN: CPT | Performed by: FAMILY MEDICINE

## 2022-09-28 PROCEDURE — 3079F DIAST BP 80-89 MM HG: CPT | Performed by: FAMILY MEDICINE

## 2022-09-28 PROCEDURE — 99396 PREV VISIT EST AGE 40-64: CPT | Performed by: FAMILY MEDICINE

## 2022-09-28 PROCEDURE — 90686 IIV4 VACC NO PRSV 0.5 ML IM: CPT | Performed by: FAMILY MEDICINE

## 2022-09-28 PROCEDURE — 3074F SYST BP LT 130 MM HG: CPT | Performed by: FAMILY MEDICINE

## 2022-09-28 PROCEDURE — 3008F BODY MASS INDEX DOCD: CPT | Performed by: FAMILY MEDICINE

## 2022-12-22 ENCOUNTER — HOSPITAL ENCOUNTER (OUTPATIENT)
Dept: MAMMOGRAPHY | Facility: HOSPITAL | Age: 52
Discharge: HOME | End: 2022-12-22
Attending: FAMILY MEDICINE
Payer: COMMERCIAL

## 2022-12-22 DIAGNOSIS — Z12.31 SCREENING MAMMOGRAM FOR BREAST CANCER: ICD-10-CM

## 2022-12-22 PROCEDURE — 77067 SCR MAMMO BI INCL CAD: CPT | Performed by: FAMILY MEDICINE

## 2022-12-22 PROCEDURE — 77063 BREAST TOMOSYNTHESIS BI: CPT | Performed by: FAMILY MEDICINE

## 2023-03-27 ENCOUNTER — OFFICE VISIT (OUTPATIENT)
Dept: FAMILY MEDICINE CLINIC | Facility: CLINIC | Age: 53
End: 2023-03-27
Payer: COMMERCIAL

## 2023-03-27 VITALS
HEIGHT: 62 IN | BODY MASS INDEX: 30.36 KG/M2 | RESPIRATION RATE: 16 BRPM | WEIGHT: 165 LBS | DIASTOLIC BLOOD PRESSURE: 74 MMHG | HEART RATE: 61 BPM | OXYGEN SATURATION: 98 % | SYSTOLIC BLOOD PRESSURE: 120 MMHG | TEMPERATURE: 98 F

## 2023-03-27 DIAGNOSIS — S42.402A ELBOW FRACTURE, LEFT, CLOSED, INITIAL ENCOUNTER: Primary | ICD-10-CM

## 2023-03-27 RX ORDER — RIBOFLAVIN (VITAMIN B2) 100 MG
TABLET ORAL
COMMUNITY

## 2023-03-27 RX ORDER — ATORVASTATIN CALCIUM 10 MG/1
TABLET, FILM COATED ORAL
COMMUNITY
End: 2023-03-27 | Stop reason: ALTCHOICE

## 2023-03-27 RX ORDER — AMOXICILLIN 250 MG
CAPSULE ORAL
COMMUNITY
End: 2023-03-27 | Stop reason: ALTCHOICE

## 2023-03-27 RX ORDER — MELOXICAM 15 MG/1
15 TABLET ORAL DAILY
Qty: 30 TABLET | Refills: 0 | Status: SHIPPED | OUTPATIENT
Start: 2023-03-27 | End: 2023-04-26

## 2023-03-28 ENCOUNTER — TELEPHONE (OUTPATIENT)
Dept: ORTHOPEDICS CLINIC | Facility: CLINIC | Age: 53
End: 2023-03-28

## 2023-03-28 DIAGNOSIS — M25.512 LEFT SHOULDER PAIN, UNSPECIFIED CHRONICITY: Primary | ICD-10-CM

## 2023-03-28 NOTE — TELEPHONE ENCOUNTER
Patient called to request an appt for a left elbow fx. Patient was originally seen at [facility] and does not have imaging in epic. PT was referred to our office by Jessie GRANADOS. Please advise when patient can be seen.      Patient can be reached at [phone]

## 2023-03-28 NOTE — TELEPHONE ENCOUNTER
Patient scheduled and advised to arrive early for xray    Future Appointments   Date Time Provider Angelica Bonilla   3/30/2023  8:00 AM Joseph Woods MD [facility]

## 2023-03-28 NOTE — TELEPHONE ENCOUNTER
Please schedule pt with Dr Justo Coroando for 8am Thursday 3/30/21. Updated xray imaging needed as well, to get xray OUT OF [place]. Thank you!

## 2023-03-30 ENCOUNTER — OFFICE VISIT (OUTPATIENT)
Dept: ORTHOPEDICS CLINIC | Facility: CLINIC | Age: 53
End: 2023-03-30
Payer: COMMERCIAL

## 2023-03-30 ENCOUNTER — HOSPITAL ENCOUNTER (OUTPATIENT)
Dept: GENERAL RADIOLOGY | Age: 53
Discharge: HOME | End: 2023-03-30
Attending: ORTHOPAEDIC SURGERY
Payer: COMMERCIAL

## 2023-03-30 ENCOUNTER — TELEPHONE (OUTPATIENT)
Dept: ORTHOPEDICS CLINIC | Facility: CLINIC | Age: 53
End: 2023-03-30

## 2023-03-30 VITALS — WEIGHT: 165 LBS | HEIGHT: 62 IN | BODY MASS INDEX: 30.36 KG/M2

## 2023-03-30 DIAGNOSIS — M25.512 LEFT SHOULDER PAIN, UNSPECIFIED CHRONICITY: ICD-10-CM

## 2023-03-30 DIAGNOSIS — S42.452A CLOSED FRACTURE OF CAPITELLUM OF DISTAL HUMERUS, LEFT, INITIAL ENCOUNTER: Primary | ICD-10-CM

## 2023-03-30 PROCEDURE — 3008F BODY MASS INDEX DOCD: CPT | Performed by: ORTHOPAEDIC SURGERY

## 2023-03-30 PROCEDURE — 73080 X-RAY EXAM OF ELBOW: CPT | Performed by: ORTHOPAEDIC SURGERY

## 2023-03-30 PROCEDURE — 99204 OFFICE O/P NEW MOD 45 MIN: CPT | Performed by: ORTHOPAEDIC SURGERY

## 2023-03-30 NOTE — PROGRESS NOTES
SURGICAL BOOKING SHEET   Name: Ramy Wells  MRN: [id number]   : 3/19/1970    Surgical Date:   23   Surgical Consent:   Open reduction internal fixation of left capitellum fracture   Diagnosis:    (S42.452A) Closed fracture of capitellum of distal humerus, left, initial encounter  (primary encounter diagnosis)    Procedure Codes:   ORIF Capitellum JL(72211)   Disposition:   Outpatient   Operative Time:   2 hrs   Antibiotics:   Ancef 2g   Anesthesia Type:   Regional   Clearance:    NONE   Equipment:   ORIF Scaphoid [employer] headless screw system, Lead hand, [place] blade, Full C arm, Bone tamp, TXA, Ioban, Suture: 0 Vicryl UR 6, 3-0 Vicryl, 3-0 Monocryl, 4-0 Monocryl, Sterile tourniquet and 0 PDS, [employer] JuggerKnot Short Rigid 1.45mm   Positioning:   Supine with arm table on OR table   Assistant:   Assistant: Lance Du PA-C   Follow Up:   7-10 days post op with Stephenie Buck   Pain Medication:   Norco   Therapy:   DPT

## 2023-04-03 PROBLEM — S42.409A HUMERAL DISTAL FRACTURE: Status: ACTIVE | Noted: 2023-03-30

## 2023-04-03 NOTE — ADDENDUM NOTE
[facility]: [facility] on: 4/3/2023 01:24 PM     Modules accepted: Orders

## 2023-09-29 ENCOUNTER — OFFICE VISIT (OUTPATIENT)
Dept: FAMILY MEDICINE CLINIC | Facility: CLINIC | Age: 53
End: 2023-09-29
Payer: COMMERCIAL

## 2023-09-29 VITALS
BODY MASS INDEX: 30.36 KG/M2 | HEIGHT: 62 IN | TEMPERATURE: 98 F | RESPIRATION RATE: 16 BRPM | OXYGEN SATURATION: 98 % | WEIGHT: 165 LBS | HEART RATE: 60 BPM | SYSTOLIC BLOOD PRESSURE: 158 MMHG | DIASTOLIC BLOOD PRESSURE: 90 MMHG

## 2023-09-29 DIAGNOSIS — Z00.00 LABORATORY EXAM ORDERED AS PART OF ROUTINE GENERAL MEDICAL EXAMINATION: ICD-10-CM

## 2023-09-29 DIAGNOSIS — E78.2 MIXED HYPERLIPIDEMIA: ICD-10-CM

## 2023-09-29 DIAGNOSIS — Z00.00 ROUTINE MEDICAL EXAM: Primary | ICD-10-CM

## 2023-09-29 DIAGNOSIS — N63.21 MASS OF UPPER OUTER QUADRANT OF LEFT BREAST: ICD-10-CM

## 2023-09-29 DIAGNOSIS — N64.4 BREAST PAIN, LEFT: ICD-10-CM

## 2023-09-29 DIAGNOSIS — I10 ESSENTIAL HYPERTENSION: ICD-10-CM

## 2023-09-29 PROBLEM — H35.419 RETINAL LATTICE DEGENERATION: Status: ACTIVE | Noted: 2023-09-29

## 2023-09-29 PROBLEM — S42.409A HUMERAL DISTAL FRACTURE: Status: RESOLVED | Noted: 2023-03-30 | Resolved: 2023-09-29

## 2023-09-29 PROBLEM — H33.329 ROUND HOLE OF RETINA WITHOUT DETACHMENT: Status: ACTIVE | Noted: 2023-09-29

## 2023-09-29 PROBLEM — Z86.32 HISTORY OF GESTATIONAL DIABETES: Status: ACTIVE | Noted: 2023-09-29

## 2023-09-29 PROBLEM — H43.819 VITREOUS DEGENERATION: Status: ACTIVE | Noted: 2023-09-29

## 2023-09-29 PROBLEM — Z86.16 HISTORY OF COVID-19: Status: RESOLVED | Noted: 2022-01-31 | Resolved: 2023-09-29

## 2023-09-29 PROCEDURE — 3080F DIAST BP >= 90 MM HG: CPT | Performed by: FAMILY MEDICINE

## 2023-09-29 PROCEDURE — 99396 PREV VISIT EST AGE 40-64: CPT | Performed by: FAMILY MEDICINE

## 2023-09-29 PROCEDURE — 3008F BODY MASS INDEX DOCD: CPT | Performed by: FAMILY MEDICINE

## 2023-09-29 PROCEDURE — 3077F SYST BP >= 140 MM HG: CPT | Performed by: FAMILY MEDICINE

## 2023-10-02 ENCOUNTER — LAB ENCOUNTER (OUTPATIENT)
Dept: LAB | Age: 53
End: 2023-10-02
Attending: FAMILY MEDICINE
Payer: COMMERCIAL

## 2023-10-02 LAB
ALBUMIN SERPL-MCNC: 3.5 G/DL (ref 3.4–5)
ALBUMIN/GLOB SERPL: 0.9 {RATIO} (ref 1–2)
ALP LIVER SERPL-CCNC: 80 U/L
ALT SERPL-CCNC: 43 U/L
ANION GAP SERPL CALC-SCNC: 5 MMOL/L (ref 0–18)
AST SERPL-CCNC: 28 U/L (ref 15–37)
BASOPHILS # BLD AUTO: 0.04 X10(3) UL (ref 0–0.2)
BASOPHILS NFR BLD AUTO: 0.7 %
BILIRUB SERPL-MCNC: 0.3 MG/DL (ref 0.1–2)
BUN BLD-MCNC: 15 MG/DL (ref 7–18)
CALCIUM BLD-MCNC: 9.2 MG/DL (ref 8.5–10.1)
CHLORIDE SERPL-SCNC: 109 MMOL/L (ref 98–112)
CHOLEST SERPL-MCNC: 271 MG/DL (ref ?–200)
CO2 SERPL-SCNC: 26 MMOL/L (ref 21–32)
CREAT BLD-MCNC: 0.74 MG/DL
EGFRCR SERPLBLD CKD-EPI 2021: 97 ML/MIN/1.73M2 (ref 60–?)
EOSINOPHIL # BLD AUTO: 0.2 X10(3) UL (ref 0–0.7)
EOSINOPHIL NFR BLD AUTO: 3.3 %
ERYTHROCYTE [DISTWIDTH] IN BLOOD BY AUTOMATED COUNT: 12.7 %
FASTING PATIENT LIPID ANSWER: YES
FASTING STATUS PATIENT QL REPORTED: YES
GLOBULIN PLAS-MCNC: 3.8 G/DL (ref 2.8–4.4)
GLUCOSE BLD-MCNC: 121 MG/DL (ref 70–99)
HCT VFR BLD AUTO: 41.1 %
HDLC SERPL-MCNC: 67 MG/DL (ref 40–59)
HGB BLD-MCNC: 13.8 G/DL
IMM GRANULOCYTES # BLD AUTO: 0.01 X10(3) UL (ref 0–1)
IMM GRANULOCYTES NFR BLD: 0.2 %
LDLC SERPL CALC-MCNC: 190 MG/DL (ref ?–100)
LYMPHOCYTES # BLD AUTO: 2.7 X10(3) UL (ref 1–4)
LYMPHOCYTES NFR BLD AUTO: 44.3 %
MCH RBC QN AUTO: 29.9 PG (ref 26–34)
MCHC RBC AUTO-ENTMCNC: 33.6 G/DL (ref 31–37)
MCV RBC AUTO: 89 FL
MONOCYTES # BLD AUTO: 0.4 X10(3) UL (ref 0.1–1)
MONOCYTES NFR BLD AUTO: 6.6 %
NEUTROPHILS # BLD AUTO: 2.74 X10 (3) UL (ref 1.5–7.7)
NEUTROPHILS # BLD AUTO: 2.74 X10(3) UL (ref 1.5–7.7)
NEUTROPHILS NFR BLD AUTO: 44.9 %
NONHDLC SERPL-MCNC: 204 MG/DL (ref ?–130)
OSMOLALITY SERPL CALC.SUM OF ELEC: 292 MOSM/KG (ref 275–295)
PLATELET # BLD AUTO: 220 10(3)UL (ref 150–450)
POTASSIUM SERPL-SCNC: 3.9 MMOL/L (ref 3.5–5.1)
PROT SERPL-MCNC: 7.3 G/DL (ref 6.4–8.2)
RBC # BLD AUTO: 4.62 X10(6)UL
SODIUM SERPL-SCNC: 140 MMOL/L (ref 136–145)
TRIGL SERPL-MCNC: 86 MG/DL (ref 30–149)
TSI SER-ACNC: 0.81 MIU/ML (ref 0.36–3.74)
VLDLC SERPL CALC-MCNC: 18 MG/DL (ref 0–30)
WBC # BLD AUTO: 6.1 X10(3) UL (ref 4–11)

## 2023-10-02 PROCEDURE — 85025 COMPLETE CBC W/AUTO DIFF WBC: CPT | Performed by: FAMILY MEDICINE

## 2023-10-02 PROCEDURE — 80061 LIPID PANEL: CPT | Performed by: FAMILY MEDICINE

## 2023-10-02 PROCEDURE — 84443 ASSAY THYROID STIM HORMONE: CPT | Performed by: FAMILY MEDICINE

## 2023-10-02 PROCEDURE — 36415 COLL VENOUS BLD VENIPUNCTURE: CPT | Performed by: FAMILY MEDICINE

## 2023-10-02 PROCEDURE — 80053 COMPREHEN METABOLIC PANEL: CPT | Performed by: FAMILY MEDICINE

## 2023-10-02 PROCEDURE — 83036 HEMOGLOBIN GLYCOSYLATED A1C: CPT | Performed by: FAMILY MEDICINE

## 2023-10-04 DIAGNOSIS — R73.09 ELEVATED GLUCOSE: Primary | ICD-10-CM

## 2023-10-05 LAB
EST. AVERAGE GLUCOSE BLD GHB EST-MCNC: 128 MG/DL (ref 68–126)
HBA1C MFR BLD: 6.1 % (ref ?–5.7)

## 2023-10-06 PROBLEM — R73.01 IFG (IMPAIRED FASTING GLUCOSE): Status: ACTIVE | Noted: 2023-10-01

## 2023-10-12 ENCOUNTER — HOSPITAL ENCOUNTER (OUTPATIENT)
Dept: MAMMOGRAPHY | Facility: HOSPITAL | Age: 53
Discharge: HOME | End: 2023-10-12
Attending: FAMILY MEDICINE
Payer: COMMERCIAL

## 2023-10-12 DIAGNOSIS — N64.4 BREAST PAIN, LEFT: ICD-10-CM

## 2023-10-12 DIAGNOSIS — N63.21 MASS OF UPPER OUTER QUADRANT OF LEFT BREAST: ICD-10-CM

## 2023-10-12 PROCEDURE — 77066 DX MAMMO INCL CAD BI: CPT | Performed by: FAMILY MEDICINE

## 2023-10-12 PROCEDURE — 76642 ULTRASOUND BREAST LIMITED: CPT | Performed by: FAMILY MEDICINE

## 2023-10-12 PROCEDURE — 77062 BREAST TOMOSYNTHESIS BI: CPT | Performed by: FAMILY MEDICINE

## 2024-06-28 ENCOUNTER — OFFICE VISIT (OUTPATIENT)
Dept: FAMILY MEDICINE CLINIC | Facility: CLINIC | Age: 54
End: 2024-06-28

## 2024-06-28 ENCOUNTER — HOSPITAL ENCOUNTER (OUTPATIENT)
Dept: CT IMAGING | Facility: HOSPITAL | Age: 54
Discharge: HOME | End: 2024-06-28
Attending: FAMILY MEDICINE
Payer: COMMERCIAL

## 2024-06-28 VITALS
DIASTOLIC BLOOD PRESSURE: 74 MMHG | TEMPERATURE: 97 F | OXYGEN SATURATION: 99 % | RESPIRATION RATE: 18 BRPM | WEIGHT: 168 LBS | SYSTOLIC BLOOD PRESSURE: 130 MMHG | HEART RATE: 66 BPM | HEIGHT: 62 IN | BODY MASS INDEX: 30.91 KG/M2

## 2024-06-28 DIAGNOSIS — R10.84 GENERALIZED ABDOMINAL PAIN: ICD-10-CM

## 2024-06-28 DIAGNOSIS — R10.84 GENERALIZED ABDOMINAL PAIN: Primary | ICD-10-CM

## 2024-06-28 LAB
CREAT BLD-MCNC: 0.9 MG/DL
EGFRCR SERPLBLD CKD-EPI 2021: 76 ML/MIN/1.73M2 (ref 60–?)

## 2024-06-28 PROCEDURE — 82565 ASSAY OF CREATININE: CPT

## 2024-06-28 PROCEDURE — 99214 OFFICE O/P EST MOD 30 MIN: CPT | Performed by: FAMILY MEDICINE

## 2024-06-28 PROCEDURE — 74177 CT ABD & PELVIS W/CONTRAST: CPT | Performed by: FAMILY MEDICINE

## 2024-06-28 RX ORDER — PANTOPRAZOLE SODIUM 20 MG/1
20 TABLET, DELAYED RELEASE ORAL
Qty: 30 TABLET | Refills: 0 | Status: SHIPPED | OUTPATIENT
Start: 2024-06-28 | End: 2024-07-28

## 2024-06-28 NOTE — PROGRESS NOTES
Subjective:   Kateryna Han is a 54 year old female who presents for Stomach Pain (Patient states she has been having stomach pain since Sunday, no diarrhea or vomiting. Patient states she went out for dinner with her  on Saturday and then the stomach pain started Sunday morning. Patient states she has been drinking tea to help with her stomach.)     Patient states that she is feeling very weak and tired. Patient has abdominal pain and generalized body aches x 1 week. Patient states that she is overall not doing well and that she isn't going out anywhere all week because she feels so poorly. Patient denies any n/v/d. Patient states that she is having right upper quandrant pain and epigastric pain. Patient states that she is having middle abdominal pain. Patient states that she is drinking tea and attempting to relax but nothing is helping. Patient states that she has attempting massage and teas to help with the nausea and overall malaise.   History/Other:    Chief Complaint Reviewed and Verified  Nursing Notes Reviewed and   Verified  Tobacco Reviewed  Allergies Reviewed  Medications Reviewed    Problem List Reviewed  Medical History Reviewed  Surgical History   Reviewed  OB Status Reviewed  Family History Reviewed  Social History   Reviewed         Tobacco:  She has never smoked tobacco.    Current Outpatient Medications   Medication Sig Dispense Refill    pantoprazole 20 MG Oral Tab EC Take 1 tablet (20 mg total) by mouth every morning before breakfast. 30 tablet 0         Review of Systems:  Review of Systems   Constitutional:  Positive for activity change, appetite change and fatigue. Negative for fever.   Gastrointestinal:  Positive for abdominal pain and nausea. Negative for constipation, diarrhea, rectal pain and vomiting.   Genitourinary: Negative.  Negative for dysuria, flank pain, frequency and pelvic pain.   Musculoskeletal:  Positive for myalgias, neck pain and neck stiffness.          Objective:   /74 (BP Location: Left arm, Patient Position: Sitting, Cuff Size: adult)   Pulse 66   Temp 97.1 °F (36.2 °C) (Temporal)   Resp 18   Ht 5' 2\" (1.575 m)   Wt 168 lb (76.2 kg)   LMP 01/01/2023   SpO2 99%   BMI 30.73 kg/m²  Estimated body mass index is 30.73 kg/m² as calculated from the following:    Height as of this encounter: 5' 2\" (1.575 m).    Weight as of this encounter: 168 lb (76.2 kg).  Physical Exam  Constitutional:       Appearance: She is well-developed.   HENT:      Head: Normocephalic and atraumatic.      Nose: Nose normal.      Mouth/Throat:      Mouth: Mucous membranes are moist.      Pharynx: Oropharynx is clear.   Eyes:      Conjunctiva/sclera: Conjunctivae normal.   Cardiovascular:      Rate and Rhythm: Normal rate.   Pulmonary:      Effort: Pulmonary effort is normal.   Abdominal:      General: There is no distension.      Palpations: Abdomen is soft. There is no mass.      Tenderness: There is abdominal tenderness. There is guarding. There is no rebound.      Hernia: No hernia is present.   Skin:     General: Skin is warm and dry.   Neurological:      General: No focal deficit present.      Mental Status: She is alert and oriented to person, place, and time.   Psychiatric:         Mood and Affect: Mood normal.         Behavior: Behavior normal.         Thought Content: Thought content normal.         Judgment: Judgment normal.           Assessment & Plan:   1. Generalized abdominal pain (Primary)  Comments:  x1 week.  Orders:  -     Cancel: CT ABDOMEN+PELVIS(CONTRAST ONLY)(CPT=74177); Future; Expected date: 06/28/2024  -     Pantoprazole Sodium; Take 1 tablet (20 mg total) by mouth every morning before breakfast.  Dispense: 30 tablet; Refill: 0  -     CT ABDOMEN+PELVIS(CONTRAST ONLY)(CPT=74177); Future; Expected date: 06/28/2024  Do not eat or drink anything until after the CT scan.   Continue to hydrate using water after CT scan.   Will address findings from CT  scan when available. I am on call provider for the weekend.        Gabriele Currie, ROBERTA, 6/28/2024, 1:23 PM

## 2024-07-02 ENCOUNTER — TELEPHONE (OUTPATIENT)
Dept: FAMILY MEDICINE CLINIC | Facility: CLINIC | Age: 54
End: 2024-07-02

## 2024-07-02 DIAGNOSIS — D25.9 UTERINE LEIOMYOMA, UNSPECIFIED LOCATION: Primary | ICD-10-CM

## 2024-07-02 RX ORDER — MELOXICAM 15 MG/1
15 TABLET ORAL DAILY
Qty: 30 TABLET | Refills: 0 | Status: SHIPPED | OUTPATIENT
Start: 2024-07-02 | End: 2024-08-01

## 2024-07-02 NOTE — TELEPHONE ENCOUNTER
Gabriele Currie, APRN  You5 minutes ago (2:09 PM)       Sent meloxicam and ordered ultrasound and ob referral for uterine fibroid. Have patient schedule with ultrasound and ob please.    Gabriele

## 2024-07-02 NOTE — TELEPHONE ENCOUNTER
Patient calling stating she has not received a call from the office. This PSR advised patient to call OB/Gyn and centralized scheduling per triage [employer] message.     Patient wants to know if she should continue taking pantoprazole 20 MG Oral Tab EC.     Please call the patient.

## 2024-07-02 NOTE — TELEPHONE ENCOUNTER
Patient called to obtain OB/Gyne appointment, next available August/September. Asking if she can be referred to another provider. Patient also asking if taking Pantoprazole and Meloxicam as prescribed at the same time. Please advise

## 2024-07-02 NOTE — TELEPHONE ENCOUNTER
Patient calling in stating that she got her CT scan done and received results. Patient states that she finished her medication but it still experiencing abdominal pain.    Please call patient and advise.

## 2024-07-03 ENCOUNTER — HOSPITAL ENCOUNTER (OUTPATIENT)
Dept: ULTRASOUND IMAGING | Age: 54
Discharge: HOME | End: 2024-07-03
Attending: FAMILY MEDICINE
Payer: COMMERCIAL

## 2024-07-03 DIAGNOSIS — D25.9 UTERINE LEIOMYOMA, UNSPECIFIED LOCATION: ICD-10-CM

## 2024-07-03 PROCEDURE — 76856 US EXAM PELVIC COMPLETE: CPT | Performed by: FAMILY MEDICINE

## 2024-07-03 PROCEDURE — 76830 TRANSVAGINAL US NON-OB: CPT | Performed by: FAMILY MEDICINE

## 2024-07-08 ENCOUNTER — APPOINTMENT (OUTPATIENT)
Dept: ULTRASOUND IMAGING | Age: 54
End: 2024-07-08
Attending: EMERGENCY MEDICINE
Payer: COMMERCIAL

## 2024-07-08 ENCOUNTER — TELEPHONE (OUTPATIENT)
Dept: FAMILY MEDICINE CLINIC | Facility: CLINIC | Age: 54
End: 2024-07-08

## 2024-07-08 ENCOUNTER — OFFICE VISIT (OUTPATIENT)
Dept: FAMILY MEDICINE CLINIC | Facility: CLINIC | Age: 54
End: 2024-07-08
Payer: COMMERCIAL

## 2024-07-08 ENCOUNTER — HOSPITAL ENCOUNTER (EMERGENCY)
Age: 54
Discharge: HOME | End: 2024-07-08
Attending: EMERGENCY MEDICINE
Payer: COMMERCIAL

## 2024-07-08 VITALS
SYSTOLIC BLOOD PRESSURE: 132 MMHG | OXYGEN SATURATION: 98 % | DIASTOLIC BLOOD PRESSURE: 80 MMHG | WEIGHT: 168 LBS | HEIGHT: 62 IN | TEMPERATURE: 97 F | BODY MASS INDEX: 30.91 KG/M2 | HEART RATE: 63 BPM | RESPIRATION RATE: 18 BRPM

## 2024-07-08 VITALS
HEART RATE: 56 BPM | SYSTOLIC BLOOD PRESSURE: 147 MMHG | HEIGHT: 62 IN | WEIGHT: 168 LBS | BODY MASS INDEX: 30.91 KG/M2 | DIASTOLIC BLOOD PRESSURE: 71 MMHG | OXYGEN SATURATION: 95 % | TEMPERATURE: 98 F | RESPIRATION RATE: 16 BRPM

## 2024-07-08 DIAGNOSIS — R10.10 PAIN OF UPPER ABDOMEN: Primary | ICD-10-CM

## 2024-07-08 DIAGNOSIS — R10.9 ABDOMINAL PAIN OF UNKNOWN ETIOLOGY: Primary | ICD-10-CM

## 2024-07-08 DIAGNOSIS — R10.84 GENERALIZED ABDOMINAL PAIN: Primary | ICD-10-CM

## 2024-07-08 LAB
ALBUMIN SERPL-MCNC: 3.7 G/DL (ref 3.4–5)
ALBUMIN/GLOB SERPL: 0.9 {RATIO} (ref 1–2)
ALP LIVER SERPL-CCNC: 74 U/L
ALT SERPL-CCNC: 37 U/L
ANION GAP SERPL CALC-SCNC: 4 MMOL/L (ref 0–18)
AST SERPL-CCNC: 14 U/L (ref 15–37)
ATRIAL RATE: 57 BPM
B-HCG UR QL: NEGATIVE
BASOPHILS # BLD AUTO: 0.04 X10(3) UL (ref 0–0.2)
BASOPHILS NFR BLD AUTO: 0.6 %
BILIRUB SERPL-MCNC: 0.6 MG/DL (ref 0.1–2)
BILIRUB UR QL STRIP.AUTO: NEGATIVE
BUN BLD-MCNC: 7 MG/DL (ref 9–23)
CALCIUM BLD-MCNC: 9.1 MG/DL (ref 8.5–10.1)
CHLORIDE SERPL-SCNC: 106 MMOL/L (ref 98–112)
CLARITY UR REFRACT.AUTO: CLEAR
CO2 SERPL-SCNC: 27 MMOL/L (ref 21–32)
COLOR UR AUTO: YELLOW
CREAT BLD-MCNC: 0.74 MG/DL
EGFRCR SERPLBLD CKD-EPI 2021: 97 ML/MIN/1.73M2 (ref 60–?)
EOSINOPHIL # BLD AUTO: 0.23 X10(3) UL (ref 0–0.7)
EOSINOPHIL NFR BLD AUTO: 3.6 %
ERYTHROCYTE [DISTWIDTH] IN BLOOD BY AUTOMATED COUNT: 12.4 %
GLOBULIN PLAS-MCNC: 4 G/DL (ref 2.8–4.4)
GLUCOSE BLD-MCNC: 113 MG/DL (ref 70–99)
GLUCOSE UR STRIP.AUTO-MCNC: NEGATIVE MG/DL
HCT VFR BLD AUTO: 42.6 %
HGB BLD-MCNC: 14.5 G/DL
IMM GRANULOCYTES # BLD AUTO: 0.01 X10(3) UL (ref 0–1)
IMM GRANULOCYTES NFR BLD: 0.2 %
KETONES UR STRIP.AUTO-MCNC: NEGATIVE MG/DL
LEUKOCYTE ESTERASE UR QL STRIP.AUTO: NEGATIVE
LYMPHOCYTES # BLD AUTO: 2.18 X10(3) UL (ref 1–4)
LYMPHOCYTES NFR BLD AUTO: 34.5 %
MCH RBC QN AUTO: 30.1 PG (ref 26–34)
MCHC RBC AUTO-ENTMCNC: 34 G/DL (ref 31–37)
MCV RBC AUTO: 88.6 FL
MONOCYTES # BLD AUTO: 0.5 X10(3) UL (ref 0.1–1)
MONOCYTES NFR BLD AUTO: 7.9 %
NEUTROPHILS # BLD AUTO: 3.36 X10 (3) UL (ref 1.5–7.7)
NEUTROPHILS # BLD AUTO: 3.36 X10(3) UL (ref 1.5–7.7)
NEUTROPHILS NFR BLD AUTO: 53.2 %
NITRITE UR QL STRIP.AUTO: NEGATIVE
OSMOLALITY SERPL CALC.SUM OF ELEC: 283 MOSM/KG (ref 275–295)
P AXIS: 23 DEGREES
P-R INTERVAL: 112 MS
PH UR STRIP.AUTO: 6.5 [PH] (ref 5–8)
PLATELET # BLD AUTO: 230 10(3)UL (ref 150–450)
POTASSIUM SERPL-SCNC: 3.6 MMOL/L (ref 3.5–5.1)
PROT SERPL-MCNC: 7.7 G/DL (ref 6.4–8.2)
PROT UR STRIP.AUTO-MCNC: NEGATIVE MG/DL
Q-T INTERVAL: 416 MS
QRS DURATION: 94 MS
QTC CALCULATION (BEZET): 404 MS
R AXIS: 46 DEGREES
RBC # BLD AUTO: 4.81 X10(6)UL
RBC UR QL AUTO: NEGATIVE
SODIUM SERPL-SCNC: 137 MMOL/L (ref 136–145)
SP GR UR STRIP.AUTO: <=1.005 (ref 1–1.03)
T AXIS: 34 DEGREES
UROBILINOGEN UR STRIP.AUTO-MCNC: 0.2 MG/DL
VENTRICULAR RATE: 57 BPM
WBC # BLD AUTO: 6.3 X10(3) UL (ref 4–11)

## 2024-07-08 PROCEDURE — 80053 COMPREHEN METABOLIC PANEL: CPT | Performed by: EMERGENCY MEDICINE

## 2024-07-08 PROCEDURE — 99284 EMERGENCY DEPT VISIT MOD MDM: CPT

## 2024-07-08 PROCEDURE — 81003 URINALYSIS AUTO W/O SCOPE: CPT | Performed by: EMERGENCY MEDICINE

## 2024-07-08 PROCEDURE — S0028 INJECTION, FAMOTIDINE, 20 MG: HCPCS | Performed by: EMERGENCY MEDICINE

## 2024-07-08 PROCEDURE — 93005 ELECTROCARDIOGRAM TRACING: CPT

## 2024-07-08 PROCEDURE — 96361 HYDRATE IV INFUSION ADD-ON: CPT

## 2024-07-08 PROCEDURE — 96375 TX/PRO/DX INJ NEW DRUG ADDON: CPT

## 2024-07-08 PROCEDURE — 99285 EMERGENCY DEPT VISIT HI MDM: CPT

## 2024-07-08 PROCEDURE — 99214 OFFICE O/P EST MOD 30 MIN: CPT | Performed by: FAMILY MEDICINE

## 2024-07-08 PROCEDURE — 96374 THER/PROPH/DIAG INJ IV PUSH: CPT

## 2024-07-08 PROCEDURE — 96376 TX/PRO/DX INJ SAME DRUG ADON: CPT

## 2024-07-08 PROCEDURE — 81025 URINE PREGNANCY TEST: CPT

## 2024-07-08 PROCEDURE — 85025 COMPLETE CBC W/AUTO DIFF WBC: CPT | Performed by: EMERGENCY MEDICINE

## 2024-07-08 PROCEDURE — 76700 US EXAM ABDOM COMPLETE: CPT | Performed by: EMERGENCY MEDICINE

## 2024-07-08 RX ORDER — HYDROCODONE BITARTRATE AND ACETAMINOPHEN 5; 325 MG/1; MG/1
1-2 TABLET ORAL EVERY 6 HOURS PRN
Qty: 10 TABLET | Refills: 0 | Status: SHIPPED | OUTPATIENT
Start: 2024-07-08 | End: 2024-07-13

## 2024-07-08 RX ORDER — ONDANSETRON 4 MG/1
4 TABLET, ORALLY DISINTEGRATING ORAL EVERY 4 HOURS PRN
Qty: 10 TABLET | Refills: 0 | Status: SHIPPED | OUTPATIENT
Start: 2024-07-08 | End: 2024-07-15

## 2024-07-08 RX ORDER — HYDROMORPHONE HYDROCHLORIDE 1 MG/ML
0.5 INJECTION, SOLUTION INTRAMUSCULAR; INTRAVENOUS; SUBCUTANEOUS ONCE
Status: COMPLETED | OUTPATIENT
Start: 2024-07-08 | End: 2024-07-08

## 2024-07-08 RX ORDER — FAMOTIDINE 10 MG/ML
20 INJECTION, SOLUTION INTRAVENOUS ONCE
Status: COMPLETED | OUTPATIENT
Start: 2024-07-08 | End: 2024-07-08

## 2024-07-08 RX ORDER — MAGNESIUM HYDROXIDE/ALUMINUM HYDROXICE/SIMETHICONE 120; 1200; 1200 MG/30ML; MG/30ML; MG/30ML
30 SUSPENSION ORAL ONCE
Status: COMPLETED | OUTPATIENT
Start: 2024-07-08 | End: 2024-07-08

## 2024-07-08 RX ORDER — ONDANSETRON 2 MG/ML
4 INJECTION INTRAMUSCULAR; INTRAVENOUS ONCE
Status: COMPLETED | OUTPATIENT
Start: 2024-07-08 | End: 2024-07-08

## 2024-07-08 NOTE — TELEPHONE ENCOUNTER
Dr. Robert Sanchez from ED calling regarding pt.Seems to be in a lot of upper abd pain, but unsure of etiology. Already taking panotprazole 20mg. He now gave her ondansetron.     I have not examined pt but diff include: PUD, h pylori, sphincter of oddi dysfunction, etc.     Dr. Sanchez says she is in a lot of pain. Please refer her to GI, Dr. Lewis or first available (double check [facility]) and within the next week please. Pt to let us know if having trouble getting in within this timeframe  -also order FIT test and have her do soon to help triage what is needed.    Gabriele unsure if you have any other ideas of etiology or things we can do to help her in the interim. Please message nurses if you do.

## 2024-07-08 NOTE — ED PROVIDER NOTES
Patient Seen in: [facility] [place]      History     Chief Complaint   Patient presents with    Abdomen/Flank Pain     Stated Complaint: abdominal pain x 2 weeks    Subjective:   HPI    Patient comes to the emergency department with a 2-week history of upper abdominal pain.  The patient states that the pain has been steadily worsening.  She was seen by her primary physician and abdominal CT was performed as an outpatient.  This reading was reviewed and was unremarkable except for some possible uterine abnormalities which prompted an ultrasound of the patient's pelvis.  This was also was reviewed and some thickening of the endometrial stripe was noted, but no other abnormalities were noted.  Patient states that the pain is primarily in the upper abdomen, especially in the epigastrium, particularly worsening approximately 20 minutes after eating.  She has had no fever or chills.  She has had no diarrhea.  She has no dysuria, hematuria or urinary frequency.    Objective:   Past Medical History:    Anemia    Essential hypertension    Fibroids    Fibromyalgia    History of gestational diabetes    Humeral distal fracture    L    IFG (impaired fasting glucose)    Migraines    Mixed hyperlipidemia    Visual impairment              Past Surgical History:   Procedure Laterality Date    Cholecystectomy      Eeh amb cologuard (results only)      Orif humerus fracture Left     Prior classical                   Social History     Socioeconomic History    Marital status:     Number of children: 3   Tobacco Use    Smoking status: Never    Smokeless tobacco: Never   Vaping Use    Vaping status: Never Used   Substance and Sexual Activity    Alcohol use: Yes     Comment: wine on special occasions few times a yr, never a problem    Drug use: No    Sexual activity: Not Currently     Partners: Male     Comment: in menopause   Other Topics Concern    Caffeine Concern No    Exercise Yes     Comment:  3 x a week.walk    Seat Belt Yes     Social Determinants of Health      Received from [facility], [facility]    Social Connections    Received from [facility], [facility]    Housing Stability              Review of Systems    Positive for stated complaint: abdominal pain x 2 weeks  Other systems are as noted in HPI.  Constitutional and vital signs reviewed.      All other systems reviewed and negative except as noted above.    Physical Exam     ED Triage Vitals [07/08/24 1247]   BP (!) 165/94   Pulse 64   Resp 18   Temp 98 °F (36.7 °C)   Temp src Temporal   SpO2 97 %   O2 Device None (Room air)       Current:/71   Pulse 56   Temp 98 °F (36.7 °C) (Temporal)   Resp 16   Ht 157.5 cm (5' 2\")   Wt 76.2 kg   LMP 01/01/2023   SpO2 95%   BMI 30.73 kg/m²         Physical Exam  Vitals and nursing note reviewed.   Constitutional:       Appearance: She is well-developed.   HENT:      Head: Normocephalic.   Cardiovascular:      Rate and Rhythm: Normal rate and regular rhythm.      Heart sounds: Normal heart sounds. No murmur heard.  Pulmonary:      Effort: Pulmonary effort is normal. No respiratory distress.      Breath sounds: Normal breath sounds.   Abdominal:      General: Bowel sounds are normal.      Palpations: Abdomen is soft.      Tenderness: There is abdominal tenderness in the epigastric area. There is no rebound.      Comments: Patient has generalized upper abdominal discomfort to palpation, particular in the upper abdomen.  No peritoneal findings are noted.  Patient has minimal to no tenderness in the lower abdomen.  Patient has no flank discomfort.   Musculoskeletal:         General: No tenderness. Normal range of motion.      Cervical back: Normal range of motion and neck supple.   Lymphadenopathy:      Cervical: No cervical adenopathy.   Skin:     General: Skin is warm and dry.      Findings: No rash.   Neurological:       Mental Status: She is alert and oriented to person, place, and time.      Sensory: No sensory deficit.              ED Course     Labs Reviewed   COMP METABOLIC PANEL (14) - Abnormal; Notable for the following components:       Result Value    Glucose 113 (*)     BUN 7 (*)     AST 14 (*)     A/G Ratio 0.9 (*)     All other components within normal limits   POCT PREGNANCY URINE - Normal   CBC WITH DIFFERENTIAL WITH PLATELET    Narrative:     The following orders were created for panel order CBC With Differential With Platelet.  Procedure                               Abnormality         Status                     ---------                               -----------         ------                     CBC W/ DIFFERENTIAL[[id number]]                              Final result                 Please view results for these tests on the individual orders.   URINALYSIS WITH CULTURE REFLEX   CBC W/ DIFFERENTIAL       ED Course as of 07/08/24 2058  ------------------------------------------------------------  Time: 07/08 1349  Value: POCT urine pregnancy: Negative  Comment: (Reviewed)  ------------------------------------------------------------  Time: 07/08 1402  Value: CBC With Differential With Platelet  Comment: Normal  ------------------------------------------------------------  Time: 07/08 1402  Value: Urinalysis with Culture Reflex  Comment: Normal  ------------------------------------------------------------  Time: 07/08 1431  Value: Comp Metabolic Panel (14)(!)  Comment: Chemistries unremarkable  ------------------------------------------------------------  Time: 07/08 1536  Value: US ABDOMEN COMPLETE (CPT=76700)  Comment: Fatty infiltration of the liver is noted, no gallbladder is visible.  ------------------------------------------------------------  Time: 07/08 1612  Comment: Spoke with the patient's primary physician, Dr. Galdamez to help expedite follow-up.     Medications   HYDROmorphone (Dilaudid) 1 MG/ML  injection 0.5 mg (0.5 mg Intravenous Given 7/8/24 1347)   sodium chloride 0.9 % IV bolus 1,000 mL (0 mL Intravenous Stopped 7/8/24 1544)   ondansetron (Zofran) 4 MG/2ML injection 4 mg (4 mg Intravenous Given 7/8/24 1345)   alum-mag hydroxide-simethicone (Maalox) 200-200-20 MG/5ML oral suspension 30 mL (30 mL Oral Given 7/8/24 1556)   famotidine (Pepcid) 20 mg/2mL injection 20 mg (20 mg Intravenous Given 7/8/24 1553)   HYDROmorphone (Dilaudid) 1 MG/ML injection 0.5 mg (0.5 mg Intravenous Given 7/8/24 1606)     Differential diagnoses included any number of acute and intra-abdominal processes and inflammatory conditions along with acute biliary pathology.  However, workup was negative.  Patient's symptoms were moderated with IV Dilaudid and Zofran.           MDM      Patient comes to the emergency department with abdominal pain of undetermined etiology.  Patient had previous recent CT scan and lower abdominal/pelvic ultrasound performed which did not reveal any specific etiology that could explain the level of the patient's discomfort.  Patient had lab workup performed in the emergency department and an ultrasound of the upper abdomen was also performed which revealed no significant acute pathology.  At this time, it is unclear what is causing the patient's pain, but patient was discharged home with prescription for Norco as a temporizing analgesic.  Additionally, because of the patient's persistent pain, patient's primary physician was contacted to help expedite follow-up.  Patient was discharged home with prescriptions for Norco and Zofran and instructions to follow-up with primary care physician.                                   Medical Decision Making      Disposition and Plan     Clinical Impression:  1. Abdominal pain of unknown etiology         Disposition:  Discharge  7/8/2024  4:05 pm    Follow-up:  Valerie Galdamez MD  [address] [place] [address] [id number] [phone]    Call in 1  day(s)            Medications Prescribed:  Discharge Medication List as of 7/8/2024  4:36 PM        START taking these medications    Details   HYDROcodone-acetaminophen 5-325 MG Oral Tab Take 1-2 tablets by mouth every 6 (six) hours as needed for Pain., Normal, Disp-10 tablet, R-0      ondansetron 4 MG Oral Tablet Dispersible Take 1 tablet (4 mg total) by mouth every 4 (four) hours as needed for Nausea., Normal, Disp-10 tablet, R-0

## 2024-07-08 NOTE — TELEPHONE ENCOUNTER
Last OV 6/28/24  Assessment & Plan:  1. Generalized abdominal pain (Primary)  Comments:  x1 week.  Orders:  -     Cancel: CT ABDOMEN+PELVIS(CONTRAST ONLY)(CPT=74177); Future; Expected date: 06/28/2024  -     Pantoprazole Sodium; Take 1 tablet (20 mg total) by mouth every morning before breakfast.  Dispense: 30 tablet; Refill: 0  -     CT ABDOMEN+PELVIS(CONTRAST ONLY)(CPT=74177); Future; Expected date: 06/28/2024    Do you want to double book her today?

## 2024-07-08 NOTE — TELEPHONE ENCOUNTER
Gabriele Currie, ROBERTA  You6 days ago       Yes can go to any ob/gyn and can take both medications at the same time.

## 2024-07-08 NOTE — ED INITIAL ASSESSMENT (HPI)
Pt reports mid upper abd pain for the past 2 wks, denies n/v/d or fever. Pt states she had outpatient CT and US through PCP with no significant findings but told to come to ED if pain is worse.

## 2024-07-08 NOTE — TELEPHONE ENCOUNTER
Patient is calling with severe Abdominal pain.  Patient would like to be seen in the office.    Please advise Ph. [phone]

## 2024-07-09 RX ORDER — LIDOCAINE HYDROCHLORIDE 20 MG/ML
5 SOLUTION OROPHARYNGEAL 2 TIMES DAILY
Qty: 100 ML | Refills: 0 | Status: SHIPPED | OUTPATIENT
Start: 2024-07-09 | End: 2024-07-19

## 2024-07-09 NOTE — PROGRESS NOTES
Subjective:   Kateryna Han is a 54 year old female who presents for Abdominal Pain (Patient still experiencing abdominal pain, unable to get into gynecologist until September. Patient taking pain medication but nothing is working.)   Patient states that no matter what she eats she is having severe epigastric pain. Upon palpation patient has belching pain discomfort, nausea, and dry heaves. Patient appears in obvious distress, patient is hunched over and not comfortable no matter sitting standing or laying down.     History/Other:    Chief Complaint Reviewed and Verified  Nursing Notes Reviewed and   Verified  Tobacco Reviewed  Allergies Reviewed  Medications Reviewed    Problem List Reviewed  Medical History Reviewed  Surgical History   Reviewed  OB Status Reviewed  Family History Reviewed  Social History   Reviewed         Tobacco:  She has never smoked tobacco.    Current Outpatient Medications   Medication Sig Dispense Refill    Lidocaine Viscous HCl 2 % Mouth/Throat Solution Take 5 mL by mouth in the morning and 5 mL before bedtime. Do all this for 10 days. 100 mL 0    Meloxicam 15 MG Oral Tab Take 1 tablet (15 mg total) by mouth daily. 30 tablet 0    pantoprazole 20 MG Oral Tab EC Take 1 tablet (20 mg total) by mouth every morning before breakfast. 30 tablet 0    HYDROcodone-acetaminophen 5-325 MG Oral Tab Take 1-2 tablets by mouth every 6 (six) hours as needed for Pain. 10 tablet 0    ondansetron 4 MG Oral Tablet Dispersible Take 1 tablet (4 mg total) by mouth every 4 (four) hours as needed for Nausea. 10 tablet 0         Review of Systems:  Review of Systems   Constitutional:  Positive for activity change and fatigue. Negative for fever.   Gastrointestinal:  Positive for abdominal distention, abdominal pain, nausea and vomiting. Negative for anal bleeding, blood in stool, constipation, diarrhea and rectal pain.       Objective:   /80 (BP Location: Left arm, Patient Position: Sitting, Cuff  Size: adult)   Pulse 63   Temp 97.3 °F (36.3 °C) (Temporal)   Resp 18   Ht 5' 2\" (1.575 m)   Wt 168 lb (76.2 kg)   LMP 01/01/2023   SpO2 98%   BMI 30.73 kg/m²  Estimated body mass index is 30.73 kg/m² as calculated from the following:    Height as of this encounter: 5' 2\" (1.575 m).    Weight as of this encounter: 168 lb (76.2 kg).  Physical Exam  Constitutional:       Appearance: She is well-developed.   HENT:      Head: Normocephalic and atraumatic.      Nose: Nose normal.      Mouth/Throat:      Mouth: Mucous membranes are moist.      Pharynx: Oropharynx is clear.   Eyes:      Conjunctiva/sclera: Conjunctivae normal.   Cardiovascular:      Rate and Rhythm: Normal rate.   Pulmonary:      Effort: Pulmonary effort is normal.   Abdominal:      General: Bowel sounds are normal. There is no distension or abdominal bruit. There are no signs of injury.      Palpations: Abdomen is soft. There is no shifting dullness, fluid wave, hepatomegaly, splenomegaly, mass or pulsatile mass.      Tenderness: There is generalized abdominal tenderness and tenderness in the left upper quadrant. There is no guarding. Negative signs include Charles's sign, Rovsing's sign, McBurney's sign, psoas sign and obturator sign.      Hernia: No hernia is present.       Skin:     General: Skin is warm and dry.   Neurological:      General: No focal deficit present.      Mental Status: She is alert and oriented to person, place, and time.   Psychiatric:         Mood and Affect: Mood normal. Affect is tearful.         Behavior: Behavior normal. Behavior is cooperative.         Thought Content: Thought content normal.         Judgment: Judgment normal.           Assessment & Plan:   1. Generalized abdominal pain (Primary)  -     Lidocaine Viscous HCl; Take 5 mL by mouth in the morning and 5 mL before bedtime. Do all this for 10 days.  Dispense: 100 mL; Refill: 0  Patient is in acute distress and does not appear well. Patient should proceed to ER  immediately from this appointment.       Patient was seen and discharged from the emergency department with additional medication and referral to GI for consultation asap.         No follow-ups on file.    ROBERTA Roblero, 7/9/2024, 11:08 AM

## 2024-07-09 NOTE — TELEPHONE ENCOUNTER
Please remind her to schedule appt with gyn as well for thickening of endometrial stripe. She was referred to Mai Kimble last week.

## 2024-07-17 ENCOUNTER — TELEPHONE (OUTPATIENT)
Dept: FAMILY MEDICINE CLINIC | Facility: CLINIC | Age: 54
End: 2024-07-17

## 2024-07-17 ENCOUNTER — OFFICE VISIT (OUTPATIENT)
Dept: FAMILY MEDICINE CLINIC | Facility: CLINIC | Age: 54
End: 2024-07-17
Payer: COMMERCIAL

## 2024-07-17 VITALS
DIASTOLIC BLOOD PRESSURE: 70 MMHG | BODY MASS INDEX: 30.18 KG/M2 | TEMPERATURE: 98 F | HEART RATE: 58 BPM | OXYGEN SATURATION: 98 % | WEIGHT: 164 LBS | SYSTOLIC BLOOD PRESSURE: 124 MMHG | RESPIRATION RATE: 16 BRPM | HEIGHT: 62 IN

## 2024-07-17 DIAGNOSIS — R53.1 GENERALIZED WEAKNESS: ICD-10-CM

## 2024-07-17 DIAGNOSIS — R10.13 ACUTE EPIGASTRIC PAIN: Primary | ICD-10-CM

## 2024-07-17 DIAGNOSIS — R93.89 ENDOMETRIAL THICKENING ON ULTRASOUND: ICD-10-CM

## 2024-07-17 PROBLEM — E56.9 VITAMIN DEFICIENCY: Status: ACTIVE | Noted: 2018-03-13

## 2024-07-17 PROBLEM — E66.9 OBESITY: Status: ACTIVE | Noted: 2018-04-02

## 2024-07-17 PROCEDURE — 99214 OFFICE O/P EST MOD 30 MIN: CPT | Performed by: FAMILY MEDICINE

## 2024-07-17 RX ORDER — PANTOPRAZOLE SODIUM 20 MG/1
20 TABLET, DELAYED RELEASE ORAL
Qty: 60 TABLET | Refills: 0 | Status: SHIPPED | OUTPATIENT
Start: 2024-07-17 | End: 2024-08-16

## 2024-07-17 RX ORDER — LIDOCAINE HYDROCHLORIDE 20 MG/ML
5 SOLUTION OROPHARYNGEAL 2 TIMES DAILY
Qty: 100 ML | Refills: 0 | Status: SHIPPED | OUTPATIENT
Start: 2024-07-17 | End: 2024-07-27

## 2024-07-17 RX ORDER — SUCRALFATE ORAL 1 G/10ML
1 SUSPENSION ORAL
Qty: 414 ML | Refills: 0 | Status: SHIPPED | OUTPATIENT
Start: 2024-07-17

## 2024-07-17 NOTE — PROGRESS NOTES
[facility] Visit Note  7/17/2024      Subjective:      Patient ID: Kateryna Han is a 54 year old female.    Chief Complaint:  Chief Complaint   Patient presents with    ER F/U     Was treated on 7/8/24 for abdominal pain. States she's feeling a little better but still having some pain.       HPI:  Kateryna Han is a 54 year old female who is being seen today for the above.     ID # [id number] Stan Mckinney (call marielena)  [id number] Shikha    Abd pain- started 3wks ago from the pit of her stomach to her belly button. Rates pain as a 6-7/10 but can get worse later in the day.     Treated on 7/8/24 in the ER for abdominal pain. States she's feeling a little better but still having some pain.  Was given norco and zofran from ER. .  7/8 ER labs  No known h/o h pylori. Lidocaine BID is helpful.  Drinking fresh fruit smoothies helps calm it.   Takes norco 1/d for pain  Doesn't take ondansetron b/c not having nausea  Took pantoprazole x 11 d when given by Gabriele, but didn't help the pain so stopped them after the ER visit.  Not taking meloxicam- advised her not to take it.   Not affected by eating or not eating.    Worsening factors- getting angry,  Alleviating factors- lidocaine liquid and norco.      Currently has a new pt GI appt with Dr. Lewis for 9/19/24.     Denies- sob, hematochezia, melena    US Abd 7/8/24: There is mild increased echogenicity of the liver which may represent fatty infiltration or hepatocellular disease.  Please note that this limits sensitivity for a focal hepatic lesion.      The gallbladder is not visualized.  No free fluid is seen.  If clinical symptoms persist then consider follow-up imaging.         Thickened endometrial stripe- seen on 7/8/24 pelvic US. She was referred to gyne, but hasn't made an appt yet. Reminded her to do so.      US pelvis 7/3/24:   1. There is heterogenous thickening of the endometrial stripe measuring up to 21 mm in thickness with an underlying  endometrial neoplasm or other etiologies not entirely excluded.  Clinical correlation recommended.      2. Fundal fibroid as above.      3. Unremarkable ovaries.      Please see above for further details.         Review of Systems - as stated above in the HPI      Objective:     Vitals:    07/17/24 0949   BP: 124/70   Pulse: 58   Resp: 16   Temp: 98.1 °F (36.7 °C)   TempSrc: Temporal   SpO2: 98%   Weight: 164 lb (74.4 kg)   Height: 5' 2\" (1.575 m)       Physical Examination   General:  Alert, in no acute distress  HEENT: NCAT, EOMI, mucus membranes moist   Neck:  No cervical lymphadenopathy  CV: Regular rate and rhythm. No murmurs, gallops, or rubs.  Lungs:  Clear to auscultation B, no wheezes, rales, or rhonchi, normal respiratory effort  Abd:  +bowel sounds, soft, moderate-severe tenderness of the epigastrium to the umbilicus, + belching very often during visit, breathing through her pain/discomfort, no hepatomegaly, neg Charles's sign  Ext:  No pedal edema,  Pedal pulses 2+ B        Assessment:     1. Acute epigastric pain  - CBC [6399] [Q]  - COMP METABOLIC PANEL [57944] [Q]  - pantoprazole 20 MG Oral Tab EC; Take 1 tablet (20 mg total) by mouth 2 (two) times daily before meals.  Dispense: 60 tablet; Refill: 0  - sucralfate 1 GM/10ML Oral Suspension; Take 10 mL (1 g total) by mouth 4 (four) times daily before meals and nightly.  Dispense: 414 mL; Refill: 0  - Lidocaine Viscous HCl 2 % Mouth/Throat Solution; Take 5 mL by mouth in the morning and 5 mL before bedtime. Do all this for 10 days.  Dispense: 100 mL; Refill: 0    2. Endometrial thickening on ultrasound  - pantoprazole 20 MG Oral Tab EC; Take 1 tablet (20 mg total) by mouth 2 (two) times daily before meals.  Dispense: 60 tablet; Refill: 0  - sucralfate 1 GM/10ML Oral Suspension; Take 10 mL (1 g total) by mouth 4 (four) times daily before meals and nightly.  Dispense: 414 mL; Refill: 0  - Lidocaine Viscous HCl 2 % Mouth/Throat Solution; Take 5 mL by mouth  in the morning and 5 mL before bedtime. Do all this for 10 days.  Dispense: 100 mL; Refill: 0    3. Generalized weakness  - CBC [6399] [Q]  - COMP METABOLIC PANEL [69538] [Q]  - TSH [899] [Q]      -discussed importance of taking pantoprazole even if it doesn't help right away to aid in healing as well as the sucralfate.  -cont lidocaine  -told her norco not a good long-term option  -discussed signs/sxs that would warrant going back to ER    diff dx: PUD, h pylori, GERD, gastritis, vs other         Return for we will contact you with results, call me if not able to get in with GI within 1 wk.

## 2024-07-17 NOTE — TELEPHONE ENCOUNTER
Called and left a message on Dr. Qing junes MAs voicemail asking if she could see the patient sooner due to her discomfort.  I did start her on supra fate and advised her to take pantoprazole 20 mg but increase it up to twice a day to help with the healing.  Told the patient to give me a call back if she is unable to get in within a reasonable timeframe.  When I called they stated they can move her up from 9/19 up to 9/9 but will then let them contact the patient and see what they can do for her.

## 2024-07-18 LAB
ABSOLUTE BASOPHILS: 28 CELLS/UL (ref 0–200)
ABSOLUTE EOSINOPHILS: 209 CELLS/UL (ref 15–500)
ABSOLUTE LYMPHOCYTES: 2398 CELLS/UL (ref 850–3900)
ABSOLUTE MONOCYTES: 402 CELLS/UL (ref 200–950)
ABSOLUTE NEUTROPHILS: 2464 CELLS/UL (ref 1500–7800)
ALBUMIN/GLOBULIN RATIO: 1.6 (CALC) (ref 1–2.5)
ALBUMIN: 4.5 G/DL (ref 3.6–5.1)
ALKALINE PHOSPHATASE: 66 U/L (ref 37–153)
ALT: 21 U/L (ref 6–29)
AST: 14 U/L (ref 10–35)
BASOPHILS: 0.5 %
BILIRUBIN, TOTAL: 0.8 MG/DL (ref 0.2–1.2)
BUN: 12 MG/DL (ref 7–25)
CALCIUM: 9.4 MG/DL (ref 8.6–10.4)
CARBON DIOXIDE: 28 MMOL/L (ref 20–32)
CHLORIDE: 103 MMOL/L (ref 98–110)
CREATININE: 0.77 MG/DL (ref 0.5–1.03)
EGFR: 92 ML/MIN/1.73M2
EOSINOPHILS: 3.8 %
GLOBULIN: 2.9 G/DL (CALC) (ref 1.9–3.7)
GLUCOSE: 99 MG/DL (ref 65–99)
HEMATOCRIT: 44.8 % (ref 35–45)
HEMOGLOBIN: 14.6 G/DL (ref 11.7–15.5)
LYMPHOCYTES: 43.6 %
MCH: 29.4 PG (ref 27–33)
MCHC: 32.6 G/DL (ref 32–36)
MCV: 90.1 FL (ref 80–100)
MONOCYTES: 7.3 %
MPV: 10.7 FL (ref 7.5–12.5)
NEUTROPHILS: 44.8 %
PLATELET COUNT: 205 THOUSAND/UL (ref 140–400)
POTASSIUM: 4.2 MMOL/L (ref 3.5–5.3)
PROTEIN, TOTAL: 7.4 G/DL (ref 6.1–8.1)
RDW: 12.9 % (ref 11–15)
RED BLOOD CELL COUNT: 4.97 MILLION/UL (ref 3.8–5.1)
SODIUM: 140 MMOL/L (ref 135–146)
TSH: 1.29 MIU/L
WHITE BLOOD CELL COUNT: 5.5 THOUSAND/UL (ref 3.8–10.8)

## 2024-07-25 ENCOUNTER — TELEPHONE (OUTPATIENT)
Dept: FAMILY MEDICINE CLINIC | Facility: CLINIC | Age: 54
End: 2024-07-25

## 2024-07-25 NOTE — TELEPHONE ENCOUNTER
Spoke to patient.   States she still has constant abdominal pain.   Has been taking sucralfate and pantoprazole 20 mg BID.  States she been eating bland diet and has lost 4 pounds.   Currently scheduled for OB/GYN- 9/3 and GI- 9/19.   Unable to get sooner appts.     Last BM today. States her BM's have been normal. Denies blood.   Denies n/v, diarrhea.   Has had more fatigue due to pain.   Pain is not affected by eating/drinking.    Advised patient to avoid any spices/acids, eat bland diet, push water intake. Go to ER if sx worsen. Patient verbalized understanding     Please advise

## 2024-08-23 ENCOUNTER — OFFICE VISIT (OUTPATIENT)
Dept: FAMILY MEDICINE CLINIC | Facility: CLINIC | Age: 54
End: 2024-08-23
Payer: COMMERCIAL

## 2024-08-23 VITALS — HEART RATE: 82 BPM | WEIGHT: 160.38 LBS | OXYGEN SATURATION: 93 % | HEIGHT: 62 IN | BODY MASS INDEX: 29.51 KG/M2

## 2024-08-23 DIAGNOSIS — J06.9 URTI (ACUTE UPPER RESPIRATORY INFECTION): Primary | ICD-10-CM

## 2024-08-23 DIAGNOSIS — R10.13 EPIGASTRIC PAIN: ICD-10-CM

## 2024-08-23 PROCEDURE — 99214 OFFICE O/P EST MOD 30 MIN: CPT | Performed by: FAMILY MEDICINE

## 2024-08-23 RX ORDER — PANTOPRAZOLE SODIUM 40 MG/1
40 TABLET, DELAYED RELEASE ORAL
Qty: 180 TABLET | Refills: 1 | Status: SHIPPED | OUTPATIENT
Start: 2024-08-23

## 2024-08-23 RX ORDER — DEXTROMETHORPHAN HBR, GUAIFENESIN 20; 200 MG/10ML; MG/10ML
5 SOLUTION ORAL EVERY 4 HOURS PRN
Qty: 118 ML | Refills: 0 | Status: SHIPPED | OUTPATIENT
Start: 2024-08-23 | End: 2024-09-06

## 2024-08-23 RX ORDER — PANTOPRAZOLE SODIUM 20 MG/1
20 TABLET, DELAYED RELEASE ORAL
COMMUNITY
End: 2024-08-23

## 2024-08-23 RX ORDER — METHYLPHENIDATE HYDROCHLORIDE EXTENDED RELEASE 20 MG/1
20 TABLET ORAL EVERY MORNING
COMMUNITY

## 2024-08-23 RX ORDER — AMOXICILLIN 500 MG/1
500 CAPSULE ORAL 2 TIMES DAILY
Qty: 20 CAPSULE | Refills: 0 | Status: SHIPPED | OUTPATIENT
Start: 2024-08-23 | End: 2024-09-02

## 2024-08-23 NOTE — PROGRESS NOTES
Subjective:   Patient ID: Kateryna Han is a 54 year old female.    HPI  Ms. Han is a very pleasant 54-year-old female with history of hypertension, hyperlipidemia, prediabetes, migraines, ADD presenting today for cough and congestion for the past 3 days associated with sore throat fatigue nasal and chest congestion.  Cough is slightly productive of phlegm and has been occurring frequently.  No sick contacts that she reports of.  She did not check for COVID-19.  No recent travel.  No fever no dizziness no headaches no nausea no vomiting.  However over the past several months she has been experiencing epigastric pain despite taking pantoprazole 20 mg twice a day.  Imaging was done of the abdomen and pelvis which were essentially unremarkable.  She is scheduled to see the gastroenterologist next month. I had reviewed past medical and family histories together with allergy and medication lists documented.    History/Other:   Review of Systems   Constitutional:  Positive for fatigue. Negative for fever.   HENT:  Positive for congestion, sinus pressure and sore throat. Negative for ear pain and trouble swallowing.    Cardiovascular:  Negative for chest pain, palpitations and leg swelling.   Gastrointestinal:  Positive for abdominal pain. Negative for constipation, diarrhea, nausea and vomiting.   Genitourinary:  Negative for difficulty urinating.   Neurological:  Negative for weakness, light-headedness and headaches.     Current Outpatient Medications   Medication Sig Dispense Refill    Methylphenidate HCl ER 20 MG Oral Tab CR Take 1 tablet (20 mg total) by mouth every morning.      pantoprazole 40 MG Oral Tab EC Take 1 tablet (40 mg total) by mouth 2 (two) times daily before meals. 180 tablet 1    amoxicillin 500 MG Oral Cap Take 1 capsule (500 mg total) by mouth 2 (two) times daily for 10 days. 20 capsule 0    Dextromethorphan-guaiFENesin (GUAIFENESIN-DM) 100-10 MG/5ML Oral Liquid Take 5 mL by mouth every 4  (four) hours as needed. 118 mL 0     Allergies:  Allergies   Allergen Reactions    Dipyrone HIVES, RASH and UNKNOWN     A pain med (not an opioid or nsaid)    Asa-Apap-Salicyl-Caff UNKNOWN       Objective:   Physical Exam  Vitals reviewed.   Constitutional:       General: She is not in acute distress.  HENT:      Head: Normocephalic and atraumatic.      Right Ear: Tympanic membrane, ear canal and external ear normal.      Left Ear: Tympanic membrane, ear canal and external ear normal.      Nose: Nose normal. No congestion or rhinorrhea.      Mouth/Throat:      Mouth: Mucous membranes are moist.      Pharynx: Oropharynx is clear. Posterior oropharyngeal erythema present.   Eyes:      General: No scleral icterus.     Conjunctiva/sclera: Conjunctivae normal.   Cardiovascular:      Rate and Rhythm: Normal rate and regular rhythm.      Heart sounds: Normal heart sounds.   Pulmonary:      Effort: Pulmonary effort is normal. No respiratory distress.      Breath sounds: Normal breath sounds. No wheezing or rales.   Abdominal:      General: Bowel sounds are normal. There is no distension.      Palpations: Abdomen is soft. There is no mass.      Tenderness: There is generalized abdominal tenderness and tenderness in the epigastric area. There is no guarding or rebound.   Musculoskeletal:      Right lower leg: No edema.      Left lower leg: No edema.   Skin:     General: Skin is warm.   Neurological:      Mental Status: She is alert.   Psychiatric:         Mood and Affect: Mood normal.         Behavior: Behavior normal.         Assessment & Plan:   1. URTI (acute upper respiratory infection)   -Keep hydrated  - May take Tylenol as needed for fever or pain  - We will going treat with amoxicillin and will also prescribe with guaifenesin with dextromethorphan as needed for cough suppression  - Call or come in sooner if symptoms worsen or persist  - Go to the emergency room with respiratory distress and/or severe dehydration   2.  Epigastric pain   -Differentials include gastritis versus peptic ulcer disease versus GERD  - Will increase pantoprazole to 40 mg twice a day for now  - Await evaluation and recommendations from GI     On this visit we had employed third-party  for Gurpreet, Gagan, [id number]      This note was prepared using Dragon Medical voice recognition dictation software. As a result errors may occur. When identified these errors have been corrected. While every attempt is made to correct errors during dictation discrepancies may still exist          No orders of the defined types were placed in this encounter.      Meds This Visit:  Requested Prescriptions     Signed Prescriptions Disp Refills    pantoprazole 40 MG Oral Tab  tablet 1     Sig: Take 1 tablet (40 mg total) by mouth 2 (two) times daily before meals.    amoxicillin 500 MG Oral Cap 20 capsule 0     Sig: Take 1 capsule (500 mg total) by mouth 2 (two) times daily for 10 days.    Dextromethorphan-guaiFENesin (GUAIFENESIN-DM) 100-10 MG/5ML Oral Liquid 118 mL 0     Sig: Take 5 mL by mouth every 4 (four) hours as needed.       Imaging & Referrals:  None     Eucrisa Pregnancy And Lactation Text: This medication has not been assigned a Pregnancy Risk Category but animal studies failed to show danger with the topical medication. It is unknown if the medication is excreted in breast milk.

## 2024-08-23 NOTE — PATIENT INSTRUCTIONS
Thank you for choosing Roby Sebastian MD at [facility]  To Do: Kateryna Han  1. Please take meds as directed.     Call [phone] to schedule the appointment.   Please signup for Live Calendars, which is electronic access to your record if you have not done so.  All your results will post on there.  [email or web address]   You can NOW use Live Calendars to book your appointments with us, or consider using open access scheduling which is through the [facility] website [email or web address] and type in Roby Sebastian MD and follow the links for \"Schedule Online Now\"    To schedule Imaging or tests at [place] call Central Scheduling [phone], Go to [facility] A ER Building (For example: CT scans, X rays, Ultrasound, MRI)  Cardiac Testing in ER building Building A second floor Cardiac Testing [phone] (For example: Holter Monitor, Cardiac Stress tests,Event Monitor, or 2D Echocardiograms)  [facility] call [phone] usually in [facility] A  Walk in Clinic in [place] at [address] Mon-Fri at 8am-7:30 p.m., and Sat/Sun 9:00a.m.-4:30 p.m.  Also at [address] [place] [address]  Call [phone] for info     Please call our office about any questions regarding your treatment/medicines/tests as a result of today's visit.  For your safety, read the entire package insert of all medicines prescribed to you and be aware of all of the risks of treatment even beyond those discussed today.  All therapies have potential risk of harm or side effects or medication interactions.  It is your duty and for your safety to discuss with the pharmacist and our office with questions, and to notify us and stop treatment if problems arise, but know that our intention is that the benefits outweigh those potential risks and we strive to make you healthier and to improve your quality of life.    Referrals, and Radiology Information:    If your insurance requires a referral to a specialist, please allow 5 business  days to process your referral request.    If Roby Sebastian MD orders a CT or MRI, it may take up to 10 business days to receive approval from your insurance company. Once our office has called informing you that the insurance company approved your testing, please call Central Scheduling at [phone]  Please allow our office 5 business days to contact you regarding any testing results.    Refill policies:   Allow 3 business days for refills; controlled substances may take longer and must be picked up from the office in person.  Narcotic medications can only be filled in 30 day increments and must be refilled at an office visit only.  If your prescription is due for a refill, you may be due for a follow-up appointment.  We cannot refill your maintenance medications at a preventative wellness visit.  To best provide you care, patients receiving maintenance medications need to be seen at least twice a year.

## 2024-09-03 ENCOUNTER — OFFICE VISIT (OUTPATIENT)
Dept: OBGYN CLINIC | Facility: CLINIC | Age: 54
End: 2024-09-03
Payer: COMMERCIAL

## 2024-09-03 VITALS
HEIGHT: 62 IN | SYSTOLIC BLOOD PRESSURE: 124 MMHG | BODY MASS INDEX: 29.81 KG/M2 | DIASTOLIC BLOOD PRESSURE: 76 MMHG | HEART RATE: 61 BPM | WEIGHT: 162 LBS

## 2024-09-03 DIAGNOSIS — D25.9 UTERINE LEIOMYOMA, UNSPECIFIED LOCATION: Primary | ICD-10-CM

## 2024-09-03 DIAGNOSIS — R93.89 THICKENED ENDOMETRIUM: ICD-10-CM

## 2024-09-03 DIAGNOSIS — Z12.4 CERVICAL CANCER SCREENING: ICD-10-CM

## 2024-09-03 PROCEDURE — 87624 HPV HI-RISK TYP POOLED RSLT: CPT | Performed by: NURSE PRACTITIONER

## 2024-09-03 PROCEDURE — 99213 OFFICE O/P EST LOW 20 MIN: CPT | Performed by: NURSE PRACTITIONER

## 2024-09-03 PROCEDURE — 88175 CYTOPATH C/V AUTO FLUID REDO: CPT | Performed by: NURSE PRACTITIONER

## 2024-09-03 PROCEDURE — 88305 TISSUE EXAM BY PATHOLOGIST: CPT | Performed by: NURSE PRACTITIONER

## 2024-09-03 PROCEDURE — 58100 BIOPSY OF UTERUS LINING: CPT | Performed by: NURSE PRACTITIONER

## 2024-09-03 NOTE — PATIENT INSTRUCTIONS
[facility] Department of OB/GYN  After Care Instructions for Endometrial Biopsy      Biopsy Results   You will receive a phone call with your biopsy results in 7 business days.  If you have not received your results in 7 days, please contact our office.  The results of your biopsy will determine if further treatment will be necessary.    Bleeding   You may have some light bleeding or blackish clumpy discharge for several days after your biopsy.    Restrictions    You should avoid intercourse or tampon use for 5 days after your biopsy.    Pain    You may experience mild menstrual cramping after your biopsy.  You may use Ibuprofen, Aleve or Tylenol to relieve your discomfort.  If you experience severe or persistent pain contact our office.      If you have any additional questions, please call us at [phone].

## 2024-09-03 NOTE — PROGRESS NOTES
Here for new gynecology visit.  54 year old G 4 P 3.  Patient's last menstrual period was 01/22/2022.    The  service was used for the duration of the visit.    Here referred by her PCP after having a CT recently for abdominal pain and a fibroid was found. Subsequently her PCP ordered a pelvic ultrasound was ordered to better visualize this and her endometrium was thickened to 2.14 cm.    Her past gynecologic history has a known fibroid but she hasn't had imaging since 2016 which showed an 11 x 5 x 6 cm uterus with a 1.7 x 1.5 x 2.1 cm right anterior fundal intramural fibroid and a 2.8 x 1.4 x 2.1 cm submucosal fibroid versus polyp.    Her current pelvic ultrasound was as follows:  UTERUS:  10.33 cm x 5.14 cm x 5.57 cm     Endometrium Thickness: 2.14 cm     Intramural fibroid along the right side of the uterine fundus measuring 31 x 29 x 26 mm.  There is heterogenous thickening of the endometrial stripe with an underlying endometrial neoplasm or other etiologies not entirely excluded.  Clinical   correlation recommended.    RIGHT OVARY:  2.49 cm x 1.62 cm x 2.35 cm     The right ovary appears normal in size, shape, and echogenicity. No significant masses are identified.  Blood flow demonstrated.   LEFT OVARY:  2.83 cm x 1.58 cm x 2.82 cm     The left ovary appears normal in size, shape, and echogenicity. No significant masses are identified.  Blood flow demonstrated.   CUL-DE-SAC:  Unremarkable.  No fluid or mass.    OTHER:  Negative.      I discussed with her that an endometrial biopsy would be recommended. We discussed expectations of the procedure including risks for infection and pain, bleeding. She is amenable to this and would like to proceed with the biopsy today. She was consented for the endometrial biopsy. All questions were answered.      Her menses  has been absent since 1/2022 with a light spotting episode 1/2023.    Last pap smear was in 2021 and it was normal.  No hx of abnormal pap smears.      OB Hx:  1 , 1 C/S, 1 .    Family gyn hx:  neg.   Family breast hx:  neg.  Last mammogram in 10/2023.    Past Medical History:    Anemia    Essential hypertension    Fibroids    Fibromyalgia    History of gestational diabetes    Humeral distal fracture    L    IFG (impaired fasting glucose)    Migraines    Mixed hyperlipidemia    Visual impairment     Past Surgical History:   Procedure Laterality Date    Cholecystectomy      Eeh amb cologuard (results only)      Orif humerus fracture Left     Prior classical        Current Outpatient Medications on File Prior to Visit   Medication Sig Dispense Refill    pantoprazole 40 MG Oral Tab EC Take 1 tablet (40 mg total) by mouth 2 (two) times daily before meals. 180 tablet 1     No current facility-administered medications on file prior to visit.     OB History    Para Term  AB Living   4 3 3 0 1 3   SAB IAB Ectopic Multiple Live Births   1 0 0 0 3      # Outcome Date GA Lbr Emory/2nd Weight Sex Type Anes PTL Lv   4 SAB 10/2011 8w0d          3 Term 05 40w0d  7 lb 8 oz (3.402 kg) M NORMAL SPONT   KASEY   2 Term 96 38w0d  7 lb 14.1 oz (3.575 kg) M Caesarean   KASEY   1 Term 93 40w0d  7 lb 13.2 oz (3.55 kg) F NORMAL SPONT   KASEY     ROS:    General:  No wt loss, wt gain, appetite changes.        /76   Pulse 61   Ht 62\"   Wt 162 lb (73.5 kg)   LMP 2022   BMI 29.63 kg/m²     VULVA:  No lesions or erythema.  VAGINA:  No lesions, atrophic without discharge.  CERVIX:  No lesions, friable.  Pap smear done.  Procedure:     The cervix was cleaned with betadine. Tinaculum was placed to anterior lip of cervix. Uterus sounded to approxiamtly 8 cm. Moderate blood and tissue was obtained. Patient tolerated the procedure well.     IMP/PLAN:    1. Uterine leiomyoma, unspecified location  Stable    2. Thickened endometrium  - BIOPSY OF UTERUS LINING (01035)  - Specimen to pathology , tissue    3. Cervical Cancer Screen   Thin Prep  with HPV    She was advised to call with any abnormal pain, bleeding or fever    Will await pathology.

## 2024-09-07 LAB
.: NORMAL
.: NORMAL

## 2024-09-09 LAB — HPV E6+E7 MRNA CVX QL NAA+PROBE: NEGATIVE

## 2024-09-12 ENCOUNTER — TELEPHONE (OUTPATIENT)
Dept: OBGYN CLINIC | Facility: CLINIC | Age: 54
End: 2024-09-12

## 2024-09-12 NOTE — TELEPHONE ENCOUNTER
Patient was calling to ask what her next steps are after her appointment with our office?  Please call to advise

## 2024-09-12 NOTE — TELEPHONE ENCOUNTER
Tried to call pt. Back with help of  Eleazar ID# [id number].  Had to leave a  for pt. To call back and speak with nurses .    REFER TO   HPV RESULT 9/3/24..for further information.and documentation.

## 2024-09-12 NOTE — TELEPHONE ENCOUNTER
Spoke to patient with assistance from [employer]  ID# [id number].  Patient notified of results and recommendations.  Patient verbalized understanding.

## 2024-11-26 ENCOUNTER — OFFICE VISIT (OUTPATIENT)
Dept: FAMILY MEDICINE CLINIC | Facility: CLINIC | Age: 54
End: 2024-11-26
Payer: COMMERCIAL

## 2024-11-26 ENCOUNTER — TELEPHONE (OUTPATIENT)
Dept: FAMILY MEDICINE CLINIC | Facility: CLINIC | Age: 54
End: 2024-11-26

## 2024-11-26 VITALS
WEIGHT: 160 LBS | TEMPERATURE: 98 F | HEART RATE: 62 BPM | RESPIRATION RATE: 16 BRPM | SYSTOLIC BLOOD PRESSURE: 130 MMHG | HEIGHT: 62 IN | BODY MASS INDEX: 29.44 KG/M2 | OXYGEN SATURATION: 98 % | DIASTOLIC BLOOD PRESSURE: 80 MMHG

## 2024-11-26 DIAGNOSIS — R14.2 BELCHING: ICD-10-CM

## 2024-11-26 DIAGNOSIS — R10.13 EPIGASTRIC PAIN: Primary | ICD-10-CM

## 2024-11-26 DIAGNOSIS — R10.84 GENERALIZED ABDOMINAL PAIN: ICD-10-CM

## 2024-11-26 PROCEDURE — 99214 OFFICE O/P EST MOD 30 MIN: CPT | Performed by: FAMILY MEDICINE

## 2024-11-26 RX ORDER — SUCRALFATE 1 G/1
1 TABLET ORAL
Qty: 120 TABLET | Refills: 0 | Status: SHIPPED | OUTPATIENT
Start: 2024-11-26

## 2024-11-26 RX ORDER — DEXLANSOPRAZOLE 60 MG/1
60 CAPSULE, DELAYED RELEASE ORAL DAILY
Qty: 30 CAPSULE | Refills: 0 | Status: SHIPPED | OUTPATIENT
Start: 2024-11-26 | End: 2024-12-02

## 2024-11-26 NOTE — PROGRESS NOTES
[facility] Visit Note  11/26/2024      Subjective:      Patient ID: Kateryna Han is a 54 year old female.    Chief Complaint:  Chief Complaint   Patient presents with    Follow - Up     States her epigastric pain has intensified since Thur. She did see gastro & is scheduled for an EGD/Colonoscopy on 12/13/24.       HPI:  Kateryna Han is a 54 year old female who is being seen today for the above.    Georgian interpretter Casey ID# [id number]      Epigastric pain- started in June and has intensified since Thur. Says she felt something tear inside when she picked up a gallon of milk on that day. As if there was a rubber band that was stretched when she did that. Rates her pain as a 6/10, yesterday was a lot more.  Accupuncture and seeing a chiropractor helped a little.    Sucralfate liquid made her tongue numb and not sure how much it helped her stomach. Open to trying pill form.       Hard to move/speak. Not resolving.   Is currently taking pantoprazole 40mg BID since at least August  Pain not affected by eating or having a BM  Worsening factors- none  Alleviating factors- unsure    Denies- n/v, hemoptysis, hemoptysis,     She did see gastro on 9/19/24 & is scheduled for an EGD/Colonoscopy on 12/13/24.    Dr. Lewis's note:   Recommend EGD and Colonoscopy. Informed consent was obtained from the patient for the above procedures. She was told indications, nature and outcome, alternatives, risks and complications. She fully understood and agreed to the procedures.  Consider Lactulose breath test   Continue Pantoprazole twice daily for now but will adjust at time of EGD. Medication indication, side effects and alternatives discussed. Patient verbalized understanding.  Consider trial of Nortriptyline if procedures are negative.       EKG 7/8/24:   Sinus bradycardia   Otherwise normal ECG   When compared with ECG of 23-MAR-2022 19:31,   No significant change was found   Confirmed by OMAR CHUN, SUSANA ([id number])  on 7/8/2024 3:15:26 PM     1. 2.9 cm right uterine fundal mass likely reflecting a fibroid.   2. Thickening versus distension of endometrium.  Hematometra is in the differential.  Ultrasound may offer additional characterization.   3. No additional specific abnormality.   4. Details as above.  Continued clinical correlation recommended.         Review of Systems - as stated above in the HPI      Objective:     Vitals:    11/26/24 1050   BP: 130/80   Pulse: 62   Resp: 16   Temp: 97.9 °F (36.6 °C)   TempSrc: Temporal   SpO2: 98%   Weight: 160 lb (72.6 kg)   Height: 5' 2\" (1.575 m)       Physical Examination   General:  Alert, in no acute distress  HEENT: NCAT, EOMI, normal TMs, oropharynx, and nasal turbinates.  Neck:  No cervical lymphadenopathy  CV: Regular rate and rhythm. No murmurs, gallops, or rubs.  Lungs:  Clear to auscultation B, no wheezes, rales, or rhonchi, normal respiratory effort  Abd:  +bowel sounds, soft  Ext:  No pedal edema,  Pedal pulses 2+ B        Assessment:     1. Epigastric pain  - SED RATE, WESTERGREN [809] [Q]  - C-REACTIVE PROTEIN [4420] [Q]  - CBC [6399] [Q]  - Troponin I (High Sensitivity); Future  - BASIC METABOLIC PANEL [74643] [Q]  - TISSUE TRANSGLUTAMINASE [8821] [Q]  - IMMUNOGLOBULIN A, QN, SERUM [539] [Q]  - dexlansoprazole 60 MG Oral Capsule Delayed Release; Take 60 mg by mouth daily.  Dispense: 30 capsule; Refill: 0  - CT ABDOMEN+PELVIS(CPT=74176); Future  - sucralfate 1 g Oral Tab; Take 1 tablet (1 g total) by mouth 4 (four) times daily before meals and nightly.  Dispense: 120 tablet; Refill: 0    2. Belching  - dexlansoprazole 60 MG Oral Capsule Delayed Release; Take 60 mg by mouth daily.  Dispense: 30 capsule; Refill: 0  - CT ABDOMEN+PELVIS(CPT=74176); Future  - sucralfate 1 g Oral Tab; Take 1 tablet (1 g total) by mouth 4 (four) times daily before meals and nightly.  Dispense: 120 tablet; Refill: 0    3. Generalized abdominal pain  - CT ABDOMEN+PELVIS(CPT=74176);  Future      Diff dx: h pylori, PUD, GERD, vs other  -had a normal EKG in July  -trial of sucralfate pills  -get labs and stat imaging to r/o other intrabdominal pathology  -f/u with Dr. Walter for testing      I am providing care as part of an ongoing, longitudinal care relationship.    Return for we will contact you with results of labs and imaging.

## 2024-11-26 NOTE — TELEPHONE ENCOUNTER
Received incoming fax from pharmacy on Dexilant 60MG, medication not covered. Insurance prefers Omeprazole, Pantoprazole and Rabeprazole, will place fax in MA folder

## 2024-11-27 ENCOUNTER — TELEPHONE (OUTPATIENT)
Dept: FAMILY MEDICINE CLINIC | Facility: CLINIC | Age: 54
End: 2024-11-27

## 2024-11-27 ENCOUNTER — HOSPITAL ENCOUNTER (OUTPATIENT)
Dept: CT IMAGING | Age: 54
Discharge: HOME | End: 2024-11-27
Attending: FAMILY MEDICINE
Payer: COMMERCIAL

## 2024-11-27 DIAGNOSIS — R10.13 EPIGASTRIC PAIN: Primary | ICD-10-CM

## 2024-11-27 DIAGNOSIS — R10.84 GENERALIZED ABDOMINAL PAIN: ICD-10-CM

## 2024-11-27 DIAGNOSIS — R10.13 EPIGASTRIC PAIN: ICD-10-CM

## 2024-11-27 DIAGNOSIS — R14.2 BELCHING: ICD-10-CM

## 2024-11-27 PROCEDURE — 74176 CT ABD & PELVIS W/O CONTRAST: CPT | Performed by: FAMILY MEDICINE

## 2024-11-27 NOTE — TELEPHONE ENCOUNTER
PA submitted via Cover My Meds for the Dexlansoprazole 60 MG Oral Capsule Delayed Release. Awaiting a response.             (Key: [id number])      [employer] is reviewing your PA request. Typically an electronic response will be received within 24-72 hours. To check for an update later, open this request from your dashboard.    You may close this dialog and return to your dashboard to perform other tasks.

## 2024-11-27 NOTE — TELEPHONE ENCOUNTER
Received fax from [employer] stating a Prior Authorization has been started. No name of medication was provided.     Key # [id number].     Placed in MA's folder.

## 2024-11-28 LAB
ABSOLUTE BASOPHILS: 41 CELLS/UL (ref 0–200)
ABSOLUTE EOSINOPHILS: 219 CELLS/UL (ref 15–500)
ABSOLUTE LYMPHOCYTES: 2091 CELLS/UL (ref 850–3900)
ABSOLUTE MONOCYTES: 332 CELLS/UL (ref 200–950)
ABSOLUTE NEUTROPHILS: 2417 CELLS/UL (ref 1500–7800)
BASOPHILS: 0.8 %
BUN: 13 MG/DL (ref 7–25)
C-REACTIVE PROTEIN: <3 MG/L
CALCIUM: 9.6 MG/DL (ref 8.6–10.4)
CARBON DIOXIDE: 30 MMOL/L (ref 20–32)
CHLORIDE: 101 MMOL/L (ref 98–110)
CREATININE: 0.77 MG/DL (ref 0.5–1.03)
EGFR: 92 ML/MIN/1.73M2
EOSINOPHILS: 4.3 %
GLUCOSE: 118 MG/DL (ref 65–139)
HEMATOCRIT: 45.1 % (ref 35–45)
HEMOGLOBIN: 14.9 G/DL (ref 11.7–15.5)
IMMUNOGLOBULIN A: 188 MG/DL (ref 47–310)
LYMPHOCYTES: 41 %
MCH: 30.2 PG (ref 27–33)
MCHC: 33 G/DL (ref 32–36)
MCV: 91.5 FL (ref 80–100)
MONOCYTES: 6.5 %
MPV: 10.4 FL (ref 7.5–12.5)
NEUTROPHILS: 47.4 %
PLATELET COUNT: 277 THOUSAND/UL (ref 140–400)
POTASSIUM: 4.2 MMOL/L (ref 3.5–5.3)
RDW: 12.4 % (ref 11–15)
RED BLOOD CELL COUNT: 4.93 MILLION/UL (ref 3.8–5.1)
SED RATE BY MODIFIED$WESTERGREN: 9 MM/H
SODIUM: 140 MMOL/L (ref 135–146)
TISSUE TRANSGLUTAMINASE$ANTIBODY, IGA: <1 U/ML
WHITE BLOOD CELL COUNT: 5.1 THOUSAND/UL (ref 3.8–10.8)

## 2024-12-02 ENCOUNTER — TELEPHONE (OUTPATIENT)
Dept: FAMILY MEDICINE CLINIC | Facility: CLINIC | Age: 54
End: 2024-12-02

## 2024-12-02 RX ORDER — RABEPRAZOLE SODIUM 20 MG/1
20 TABLET, DELAYED RELEASE ORAL 2 TIMES DAILY
Qty: 60 TABLET | Refills: 0 | Status: SHIPPED | OUTPATIENT
Start: 2024-12-02

## 2024-12-02 NOTE — TELEPHONE ENCOUNTER
Spoke to patient with  [employer] #[id number]. Reviewed CT abdomen and pelvis results and recommendations. Patient states that she picked up the medication for constipation but states she is not constipated. Patient states that she normally has 1 to 2 bowel movements a day. Patient states that she believes they are normal bowel movement. Advised patient to increase fluid intake and high fiber diet. Patient continues to re-iterate that she is not constipated but still has the abdominal pain. Patient states that the sucralfate prescribed is making her tired and sleepy. Informed patient that her concerns and current symptoms will be discussed with provider. Patient verbalized understanding.     Please advise.

## 2024-12-02 NOTE — TELEPHONE ENCOUNTER
May try Mylanta Maximum Strength and keep appt with GI. May stop sucralfate if not helping.    We are working on getting the dexilant approved. We ill let her know.

## 2024-12-02 NOTE — TELEPHONE ENCOUNTER
Response letter received from [employer] stating the request for the Dexlansoprazole has been denied because the requested medication and/or diagnosis are not a covered benefit and excluded from coverage in accordance with the terms and conditions of patients plan benefit.      Per [facility] the preferred alternatives are:  Omeprazole,  Pantoprazole,  Rabeprazole.      Please advise

## 2024-12-02 NOTE — TELEPHONE ENCOUNTER
See the 11/27/24 Telephone Encounter regarding response on the PA request for the Dexlansoprazole 60 mg Capsules.

## 2024-12-03 ENCOUNTER — TELEPHONE (OUTPATIENT)
Dept: FAMILY MEDICINE CLINIC | Facility: CLINIC | Age: 54
End: 2024-12-03

## 2024-12-03 NOTE — TELEPHONE ENCOUNTER
Fax received from [facility] requesting Prior Authorization for Rabeprazole Sodium 20 mg tabl. Quantity 60.     Please advise.       [facility] [address] [place], [place] - [address] [phone], [phone]  [address]  [facility] [address]  Phone: [phone] Fax: [phone]    Placed in MA's folder

## 2024-12-09 RX ORDER — RABEPRAZOLE SODIUM 20 MG/1
20 TABLET, DELAYED RELEASE ORAL DAILY
Qty: 30 TABLET | Refills: 0 | Status: SHIPPED | OUTPATIENT
Start: 2024-12-09

## 2024-12-09 NOTE — TELEPHONE ENCOUNTER
I sent a prescription for rabeprazole which is 1 pill a day.  With the severity of her symptoms I do not know if this will be much help but sometimes a different medication in the same class can be more helpful than other.  If she wants to get the prescription for 30-day supply and try it once a day for a day or 2. If not very effective may increase to twice a day for what's left of the script (~2wks) and she can then let me know if this was more beneficial than the pantoprazole 40mg BID or not.

## 2024-12-09 NOTE — TELEPHONE ENCOUNTER
Called and spoke to pharmacist at [facility] who states the Rabeprazole  Sodium 20 mg tablets are covered by the patients insurance for max of 1 tablet daily. Asking for new script to be sent for if okay to change to 1 tab daily.

## 2024-12-11 NOTE — TELEPHONE ENCOUNTER
Called patient with , Shiv #[id number]. Patient states that she picked up the prescription for Rabeprazole Sodium. She took one dose yesterday. She had some loose stool but feels like it is a little helpful. Patient advised to continue to monitor her symptoms and to let us know if she sees any improvement. All questions answered. Patient verbalized understanding.     Future Appointments   Date Time Provider Department Center   12/13/2024 10:00 AM Maureen Lewis, DO [facility] SUB GI   12/13/2024 10:15 AM Maureen Lewis, DO [facility] SUB GI

## 2024-12-13 PROBLEM — Z12.11 SPECIAL SCREENING FOR MALIGNANT NEOPLASM OF COLON: Status: ACTIVE | Noted: 2024-12-13

## 2024-12-13 PROBLEM — R10.13 ABDOMINAL PAIN, EPIGASTRIC: Status: ACTIVE | Noted: 2024-12-13

## 2025-02-24 PROBLEM — R42 DIZZINESS: Status: ACTIVE | Noted: 2018-03-12

## 2025-02-24 PROBLEM — N92.0 EXCESSIVE AND FREQUENT MENSTRUATION WITH REGULAR CYCLE: Status: ACTIVE | Noted: 2018-08-31

## 2025-02-24 PROBLEM — E61.1 IRON DEFICIENCY: Status: ACTIVE | Noted: 2018-03-13

## 2025-02-24 PROBLEM — R22.0 FACIAL SWELLING: Status: ACTIVE | Noted: 2018-06-23

## 2025-04-07 ENCOUNTER — LAB ENCOUNTER (OUTPATIENT)
Dept: LAB | Age: 55
End: 2025-04-07
Attending: FAMILY MEDICINE
Payer: COMMERCIAL

## 2025-04-07 ENCOUNTER — OFFICE VISIT (OUTPATIENT)
Dept: FAMILY MEDICINE CLINIC | Facility: CLINIC | Age: 55
End: 2025-04-07
Payer: COMMERCIAL

## 2025-04-07 VITALS
HEIGHT: 62 IN | DIASTOLIC BLOOD PRESSURE: 70 MMHG | TEMPERATURE: 98 F | BODY MASS INDEX: 30.18 KG/M2 | RESPIRATION RATE: 16 BRPM | SYSTOLIC BLOOD PRESSURE: 130 MMHG | HEART RATE: 68 BPM | WEIGHT: 164 LBS | OXYGEN SATURATION: 98 %

## 2025-04-07 DIAGNOSIS — Z00.00 ROUTINE MEDICAL EXAM: Primary | ICD-10-CM

## 2025-04-07 DIAGNOSIS — N64.4 BREAST TENDERNESS: ICD-10-CM

## 2025-04-07 DIAGNOSIS — K21.9 GASTROESOPHAGEAL REFLUX DISEASE WITHOUT ESOPHAGITIS: ICD-10-CM

## 2025-04-07 DIAGNOSIS — Z00.00 LABORATORY EXAM ORDERED AS PART OF ROUTINE GENERAL MEDICAL EXAMINATION: ICD-10-CM

## 2025-04-07 DIAGNOSIS — Z12.31 ENCOUNTER FOR SCREENING MAMMOGRAM FOR MALIGNANT NEOPLASM OF BREAST: ICD-10-CM

## 2025-04-07 DIAGNOSIS — R73.01 IFG (IMPAIRED FASTING GLUCOSE): ICD-10-CM

## 2025-04-07 PROBLEM — H33.323: Status: ACTIVE | Noted: 2025-03-20

## 2025-04-07 PROBLEM — R10.13 ABDOMINAL PAIN, EPIGASTRIC: Status: RESOLVED | Noted: 2024-12-13 | Resolved: 2025-04-07

## 2025-04-07 PROBLEM — H52.13 MYOPIA OF BOTH EYES: Status: ACTIVE | Noted: 2025-03-20

## 2025-04-07 PROBLEM — R22.0 FACIAL SWELLING: Status: RESOLVED | Noted: 2018-06-23 | Resolved: 2025-04-07

## 2025-04-07 PROBLEM — Z12.11 SPECIAL SCREENING FOR MALIGNANT NEOPLASM OF COLON: Status: RESOLVED | Noted: 2024-12-13 | Resolved: 2025-04-07

## 2025-04-07 PROBLEM — R42 DIZZINESS: Status: RESOLVED | Noted: 2018-03-12 | Resolved: 2025-04-07

## 2025-04-07 PROBLEM — H33.323: Status: RESOLVED | Noted: 2025-03-20 | Resolved: 2025-04-07

## 2025-04-07 PROBLEM — H52.4 PRESBYOPIA: Status: ACTIVE | Noted: 2025-03-20

## 2025-04-07 LAB
ALBUMIN SERPL-MCNC: 4.9 G/DL (ref 3.2–4.8)
ALBUMIN/GLOB SERPL: 1.5 {RATIO} (ref 1–2)
ALP LIVER SERPL-CCNC: 70 U/L
ALT SERPL-CCNC: 32 U/L
ANION GAP SERPL CALC-SCNC: 9 MMOL/L (ref 0–18)
AST SERPL-CCNC: 21 U/L (ref ?–34)
BASOPHILS # BLD AUTO: 0.04 X10(3) UL (ref 0–0.2)
BASOPHILS NFR BLD AUTO: 0.7 %
BILIRUB SERPL-MCNC: 0.4 MG/DL (ref 0.3–1.2)
BUN BLD-MCNC: 13 MG/DL (ref 9–23)
CALCIUM BLD-MCNC: 10.1 MG/DL (ref 8.7–10.6)
CHLORIDE SERPL-SCNC: 104 MMOL/L (ref 98–112)
CHOLEST SERPL-MCNC: 298 MG/DL (ref ?–200)
CO2 SERPL-SCNC: 29 MMOL/L (ref 21–32)
CREAT BLD-MCNC: 0.78 MG/DL
EGFRCR SERPLBLD CKD-EPI 2021: 90 ML/MIN/1.73M2 (ref 60–?)
EOSINOPHIL # BLD AUTO: 0.27 X10(3) UL (ref 0–0.7)
EOSINOPHIL NFR BLD AUTO: 5 %
ERYTHROCYTE [DISTWIDTH] IN BLOOD BY AUTOMATED COUNT: 12.6 %
EST. AVERAGE GLUCOSE BLD GHB EST-MCNC: 128 MG/DL (ref 68–126)
FASTING PATIENT LIPID ANSWER: YES
FASTING STATUS PATIENT QL REPORTED: YES
GLOBULIN PLAS-MCNC: 3.2 G/DL (ref 2–3.5)
GLUCOSE BLD-MCNC: 111 MG/DL (ref 70–99)
HBA1C MFR BLD: 6.1 % (ref ?–5.7)
HCT VFR BLD AUTO: 45.1 %
HDLC SERPL-MCNC: 70 MG/DL (ref 40–59)
HGB BLD-MCNC: 14.8 G/DL
IMM GRANULOCYTES # BLD AUTO: 0.01 X10(3) UL (ref 0–1)
IMM GRANULOCYTES NFR BLD: 0.2 %
LDLC SERPL CALC-MCNC: 205 MG/DL (ref ?–100)
LYMPHOCYTES # BLD AUTO: 2.82 X10(3) UL (ref 1–4)
LYMPHOCYTES NFR BLD AUTO: 51.7 %
MCH RBC QN AUTO: 29.7 PG (ref 26–34)
MCHC RBC AUTO-ENTMCNC: 32.8 G/DL (ref 31–37)
MCV RBC AUTO: 90.6 FL
MONOCYTES # BLD AUTO: 0.38 X10(3) UL (ref 0.1–1)
MONOCYTES NFR BLD AUTO: 7 %
NEUTROPHILS # BLD AUTO: 1.93 X10 (3) UL (ref 1.5–7.7)
NEUTROPHILS # BLD AUTO: 1.93 X10(3) UL (ref 1.5–7.7)
NEUTROPHILS NFR BLD AUTO: 35.4 %
NONHDLC SERPL-MCNC: 228 MG/DL (ref ?–130)
OSMOLALITY SERPL CALC.SUM OF ELEC: 295 MOSM/KG (ref 275–295)
PLATELET # BLD AUTO: 247 10(3)UL (ref 150–450)
POTASSIUM SERPL-SCNC: 4.3 MMOL/L (ref 3.5–5.1)
PROT SERPL-MCNC: 8.1 G/DL (ref 5.7–8.2)
RBC # BLD AUTO: 4.98 X10(6)UL
SODIUM SERPL-SCNC: 142 MMOL/L (ref 136–145)
TRIGL SERPL-MCNC: 129 MG/DL (ref 30–149)
VLDLC SERPL CALC-MCNC: 28 MG/DL (ref 0–30)
WBC # BLD AUTO: 5.5 X10(3) UL (ref 4–11)

## 2025-04-07 PROCEDURE — 80053 COMPREHEN METABOLIC PANEL: CPT | Performed by: FAMILY MEDICINE

## 2025-04-07 PROCEDURE — 99396 PREV VISIT EST AGE 40-64: CPT | Performed by: FAMILY MEDICINE

## 2025-04-07 PROCEDURE — 85025 COMPLETE CBC W/AUTO DIFF WBC: CPT | Performed by: FAMILY MEDICINE

## 2025-04-07 PROCEDURE — 80061 LIPID PANEL: CPT | Performed by: FAMILY MEDICINE

## 2025-04-07 PROCEDURE — 36415 COLL VENOUS BLD VENIPUNCTURE: CPT | Performed by: FAMILY MEDICINE

## 2025-04-07 PROCEDURE — 83036 HEMOGLOBIN GLYCOSYLATED A1C: CPT | Performed by: FAMILY MEDICINE

## 2025-04-07 RX ORDER — FAMOTIDINE 20 MG/1
20 TABLET, FILM COATED ORAL 2 TIMES DAILY PRN
Qty: 180 TABLET | Refills: 1 | Status: SHIPPED | OUTPATIENT
Start: 2025-04-07

## 2025-04-07 NOTE — PROGRESS NOTES
Chief Complaint   Patient presents with    Physical       HPI:  Kateryna Han is a 55 year old female here today for preventative visit.     ID# [id number] Umm-        Imms- up-to-date with Tdap, both Shingrix. Discussed  COVID.        Cervical cancer screening- No h/o abnormal paps, HPV, or genital warts.  Normal cotesting 9/3/24 with Eleanor Kimble. Repeat 5 years        Breast cancer screening- No known family history of breast/ovarian cancer. Normal on 10/12/23. + tenderness of the L outer breast and hurts to press on the area.        Colon cancer screening- No family h/o colon cancer. C-scope 12/13/24 int hemorrhoid, repeat 10 yrs, Dr. Lewis, [facility].         Osteoporosis screening- no reason identified for early screening with dexa        Diet/exercise- working on this        Dental/Eye Check up-  Recommended pt see dentist once every 6 months for a cleaning and once every year for an eye exam.        H/o H pylori-  found on EGD on 12/13/24 and was given tx. Follow-up breath test on 1/22/25 was negative. Feeling way better than she was.   Has a bitter spicy taste in her mouth still.  Drink water         Pre-DM-  hba1c 6.1 back in 2023 and due to recheck.      Past Medical History:    Anemia    Essential hypertension    Fatigue    Fibroids    Fibromyalgia    Flatulence/gas pain/belching    H. pylori infection    on EGD    Headache disorder    High cholesterol    History of gestational diabetes    Humeral distal fracture    L    IFG (impaired fasting glucose)    Leg swelling    Migraines    Mixed hyperlipidemia    Pain with bowel movements    Sleep disturbance    Wears glasses    Weight loss     Past Surgical History:   Procedure Laterality Date    Cholecystectomy      Colonoscopy  12/13/2024    int hemorrhoid, repeat 10 yrs, Dr. Lewis, [facility]    Eeh amb cologuard (results only)      Egd  12/13/2024    [facility]    Endometrial biopsy - jar(s): 2  09/03/2024     benign, Eleanor Gall    Orif humerus fracture Left     Prior classical        Medications Ordered Prior to Encounter[1]  Allergies[2]  Social History     Socioeconomic History    Marital status:      Spouse name: Not on file    Number of children: 3    Years of education: Not on file    Highest education level: Not on file   Occupational History    Not on file   Tobacco Use    Smoking status: Never    Smokeless tobacco: Never   Vaping Use    Vaping status: Never Used   Substance and Sexual Activity    Alcohol use: Yes     Comment: wine on special occasions few times a yr, never a problem    Drug use: No    Sexual activity: Not Currently     Partners: Male     Comment: in menopause   Other Topics Concern    Caffeine Concern No    Exercise Yes     Comment: 3 x a week.walk    Seat Belt Yes    Special Diet Not Asked    Stress Concern Not Asked    Weight Concern Not Asked     Service Not Asked    Blood Transfusions Not Asked    Occupational Exposure Not Asked    Hobby Hazards Not Asked    Sleep Concern Not Asked    Back Care Not Asked    Bike Helmet Not Asked    Self-Exams Not Asked   Social History Narrative    Not on file     Social Drivers of Health     Food Insecurity: No Food Insecurity (2025)    NCSS - Food Insecurity     Worried About Running Out of Food in the Last Year: No     Ran Out of Food in the Last Year: No   Transportation Needs: No Transportation Needs (2025)    NCSS - Transportation     Lack of Transportation: No   Stress: Not on File (2024)    Received from [facility]    Stress     Stress: 0   Housing Stability: Unknown (2025)    NCSS - Housing/Utilities     Has Housing: Yes     Worried About Losing Housing: No     Unable to Get Utilities: Not on file     Family History   Problem Relation Age of Onset    Stroke Mother     Hypertension Mother     Seizure Disorder Mother     High Cholesterol Mother     Heart Attack Father     Heart Disorder Father         CABG    Diabetes  Father     Hypertension Father     Heart Disorder Sister 12    Hypertension Brother     Hypertension Brother     Other (Other) Brother         pancreatitis    Hypertension Maternal Grandmother     No Known Problems Maternal Grandfather     Heart Attack Paternal Grandmother     Pancreatic Cancer Paternal Grandfather     Breast Cancer Neg     Colon Cancer Neg     Ovarian Cancer Neg        Review of Systems - All systems reviewed and negative except for HPI    PHYSICAL EXAM:  /70   Pulse 68   Temp 97.9 °F (36.6 °C) (Temporal)   Resp 16   Ht 5' 2\" (1.575 m)   Wt 164 lb (74.4 kg)   LMP 01/22/2022   SpO2 98%   BMI 30.00 kg/m²   GENERAL APPEARANCE:  Alert, no acute distress, appears stated age  HEENT:  Head- Normocephalic, atraumatic.    Eyes- Extraocular movements intact, pupils equally round and reactive to light,  conjunctivae normal.    Ears- Tympanic membranes intact bilaterally.    Nose- Patent, normal septum and turbinates.    Mouth/Throat- Normal oral mucosa, throat non-erythematous.  NECK:  No submental, submandibular, ant/post cervical lymphadenopathy. No thyromegaly or masses.  PULMONARY:  Lungs clear to auscultation bilaterally. No wheezes, rales, or rhonchi. Normal respiratory effort.  CARDIOVASCULAR:  Regular rate and rhythm. No murmurs, gallops, or rubs.  ABDOMEN:  + bowel sounds, soft, nontender, nondistended. No hepatomegaly.  MUSCULOSKELETAL: Strength of upper and lower extremities 5/5 bilaterally. Normal gait.  NEUROLOGIC:  Cranial nerves 2-12 grossly intact.  PSYCHIATRIC:  Normal mood, affect, and hygiene.   Breasts: breast appear normal, no suspicious masses, no skin or nipple changes/discharge. No supraclavicular or axillary nodes. Declined chaperone      ASSESSMENT/PLAN:    1. Routine medical exam    2. Encounter for screening mammogram for malignant neoplasm of breast  - [facility]AH 2D+3D SCREENING BILAT (CPT=77067/54273); Future    3. Laboratory exam ordered as part of routine general  medical examination  - CBC [6399] [Q]  - COMP METABOLIC PANEL [59237] [Q]  - LIPID PANEL [7600] [Q]    4. IFG (impaired fasting glucose)  - HGB A1C [496] [Q]    5. Gastroesophageal reflux disease without esophagitis  - famotidine 20 MG Oral Tab; Take 1 tablet (20 mg total) by mouth 2 (two) times daily as needed for Heartburn.  Dispense: 180 tablet; Refill: 1    6. Breast tenderness  - US BREAST LEFT LIMITED (CPT=76642); Future        Patient verbalized understanding and agrees to plan.      Return in about 1 year (around 4/7/2026) for annual physical, we will contact you with results.         [1]   Current Outpatient Medications on File Prior to Visit   Medication Sig Dispense Refill    Multiple Vitamin (MULTIVITAMIN ADULT OR) Take 1 tablet by mouth daily.       No current facility-administered medications on file prior to visit.   [2]   Allergies  Allergen Reactions    Dipyrone HIVES, RASH and UNKNOWN     A pain med (not an opioid or nsaid)

## 2025-04-08 DIAGNOSIS — E78.00 ELEVATED LDL CHOLESTEROL LEVEL: ICD-10-CM

## 2025-04-08 DIAGNOSIS — E78.00 ELEVATED CHOLESTEROL: Primary | ICD-10-CM

## 2025-04-08 RX ORDER — ROSUVASTATIN CALCIUM 20 MG/1
20 TABLET, COATED ORAL NIGHTLY
Qty: 90 TABLET | Refills: 0 | Status: SHIPPED | OUTPATIENT
Start: 2025-04-08

## 2025-04-09 ENCOUNTER — TELEPHONE (OUTPATIENT)
Dept: FAMILY MEDICINE CLINIC | Facility: CLINIC | Age: 55
End: 2025-04-09

## 2025-04-09 DIAGNOSIS — N64.4 BREAST TENDERNESS: Primary | ICD-10-CM

## 2025-04-09 NOTE — TELEPHONE ENCOUNTER
Patient was seen for breast tenderness. Screening mammogram and US were ordered.    Mammogram department is recommending Diagnostic Mammogram    Order pended

## 2025-04-09 NOTE — TELEPHONE ENCOUNTER
Patient would like a mammogram order Bilat Diagnostic Mammogram.  To be done at [facility].

## 2025-04-15 ENCOUNTER — OFFICE VISIT (OUTPATIENT)
Dept: FAMILY MEDICINE CLINIC | Facility: CLINIC | Age: 55
End: 2025-04-15
Payer: COMMERCIAL

## 2025-04-15 VITALS
HEIGHT: 62 IN | HEART RATE: 68 BPM | SYSTOLIC BLOOD PRESSURE: 128 MMHG | OXYGEN SATURATION: 99 % | WEIGHT: 165 LBS | RESPIRATION RATE: 16 BRPM | DIASTOLIC BLOOD PRESSURE: 76 MMHG | TEMPERATURE: 98 F | BODY MASS INDEX: 30.36 KG/M2

## 2025-04-15 DIAGNOSIS — N95.0 POSTMENOPAUSAL BLEEDING: Primary | ICD-10-CM

## 2025-04-15 PROCEDURE — 99214 OFFICE O/P EST MOD 30 MIN: CPT | Performed by: FAMILY MEDICINE

## 2025-04-15 NOTE — PROGRESS NOTES
[facility] Visit Note  4/15/2025       The following individual(s) verbally consented to be recorded using ambient AI listening technology and understand that they can each withdraw their consent to this listening technology at any point by asking the clinician to turn off or pause the recording:    Patient name: Kateryna Han      Subjective:      Patient ID: Kateryna Han is a 55 year old female.    Chief Complaint:  Chief Complaint   Patient presents with    Postmenopausal Bleeding     States her last period was Jan 2022, then yesterday vaginal bleeding started and she's still spotting today. Also feeling very bloated.       HPI:  Kateryna Han is a 55 year old female who is being seen today for the above.     History of Present Illness  Abnormal uterine bleeding-  started yesterday. The bleeding is described as fresh with small clots and has been continuous. Accompanying the bleeding, the patient reports abdominal pain, hip pain, and leg tingling. The patient denies any urinary symptoms. The patient has not had a period since January 2022 and is post-menopausal. The patient denies any recent sexual activity. The patient also reports a history of fibromas and had a benign enodmetrial biopsy on 9/3/24 with Eleanor Kimble.  + tired    Denies- lightheadedness, dizziness, fever, chills          Review of Systems - as stated above in the HPI      Objective:     Vitals:    04/15/25 1454   BP: 128/76   Pulse: 68   Resp: 16   Temp: 98.1 °F (36.7 °C)   TempSrc: Temporal   SpO2: 99%   Weight: 165 lb (74.8 kg)   Height: 5' 2\" (1.575 m)       Physical Examination   General:  Alert, in no acute distress  HEENT: NCAT, EOMI, mucus membranes moist   Neck:  No cervical lymphadenopathy, no thyromegaly  CV: Regular rate and rhythm. No murmurs, gallops, or rubs.  Lungs:  Clear to auscultation B, no wheezes, rales, or rhonchi, normal respiratory effort  Abd:  +bowel sounds, soft, + suprapubic tenderness  Ext:  No pedal  edema,  Pedal pulses 2+ B        Assessment:     1. Postmenopausal bleeding  - US PELVIS (TRANSABDOMINAL PELVIS)  (CPT=76856); Future  - OBG Referral - In Network  - TSH [899] [Q]    Assessment & Plan  Postmenopausal bleeding  Postmenopausal bleeding with differential diagnosis including endometrial hyperplasia, hormonal changes, or uterine cancer, vs other. Benign endometrial biopsy in September 2024 reduces immediate cancer concern. Further evaluation necessary.  - Order pelvic ultrasound to evaluate the uterus.  - Refer to gynecologist for further evaluation.  - Advise her to schedule an appointment with the gynecologist without waiting for ultrasound results.  - Include thyroid function tests in the next set of blood work.        I am providing care as part of an ongoing, longitudinal care relationship.    Return for we will contact you with results, see me in July (fasting labs beforehand).

## 2025-04-16 ENCOUNTER — HOSPITAL ENCOUNTER (OUTPATIENT)
Dept: ULTRASOUND IMAGING | Age: 55
Discharge: HOME | End: 2025-04-16
Attending: FAMILY MEDICINE
Payer: COMMERCIAL

## 2025-04-16 DIAGNOSIS — N95.0 POSTMENOPAUSAL BLEEDING: ICD-10-CM

## 2025-04-16 PROCEDURE — 76856 US EXAM PELVIC COMPLETE: CPT | Performed by: FAMILY MEDICINE

## 2025-04-17 ENCOUNTER — OFFICE VISIT (OUTPATIENT)
Dept: FAMILY MEDICINE CLINIC | Facility: CLINIC | Age: 55
End: 2025-04-17
Payer: COMMERCIAL

## 2025-04-17 VITALS
TEMPERATURE: 98 F | DIASTOLIC BLOOD PRESSURE: 72 MMHG | WEIGHT: 165 LBS | RESPIRATION RATE: 16 BRPM | OXYGEN SATURATION: 99 % | BODY MASS INDEX: 30.36 KG/M2 | HEIGHT: 62 IN | SYSTOLIC BLOOD PRESSURE: 130 MMHG | HEART RATE: 72 BPM

## 2025-04-17 DIAGNOSIS — N95.0 POSTMENOPAUSAL BLEEDING: Primary | ICD-10-CM

## 2025-04-17 DIAGNOSIS — R93.89 ENDOMETRIAL THICKENING ON ULTRASOUND: ICD-10-CM

## 2025-04-17 PROCEDURE — 99213 OFFICE O/P EST LOW 20 MIN: CPT | Performed by: FAMILY MEDICINE

## 2025-04-17 NOTE — PROGRESS NOTES
[facility] Visit Note  4/17/2025       The following individual(s) verbally consented to be recorded using ambient AI listening technology and understand that they can each withdraw their consent to this listening technology at any point by asking the clinician to turn off or pause the recording:    Patient name: Kateryna Han      Subjective:      Patient ID: Kateryna Han is a 55 year old female.    Chief Complaint:  Chief Complaint   Patient presents with    Test Results     Pelvis Ultrasound done yesterday. States she's no longer spotting it was on 4/14 & 4/15 only but still having some low abdominal pain.       HPI:  Kateryna Han is a 55 year old female who is being seen today for the above.      History of Present Illness  The patient, with a history of postmenopausal bleeding, presents for follow-up after a recent episode of bleeding. She underwent a pelvic ultrasound, which revealed a thickened endometrium, similar to findings from last Sept and will be seeing Eleanor Kimble again. Bleeding stopped on Tues and none since then, but still having discomfort and heaviness in her lower abdomen and hip.         Review of Systems - as stated above in the HPI      Objective:     Vitals:    04/17/25 0705   BP: 130/72   Pulse: 72   Resp: 16   Temp: 97.9 °F (36.6 °C)   TempSrc: Temporal   SpO2: 99%   Weight: 165 lb (74.8 kg)   Height: 5' 2\" (1.575 m)       Physical Examination   General:  Alert, in no acute distress  HEENT: NCAT, EOMI, mucus membranes moist   Neck:  No cervical lymphadenopathy  CV: Regular rate and rhythm. No murmurs, gallops, or rubs.  Lungs:  Clear to auscultation B, no wheezes, rales, or rhonchi, normal respiratory effort  Abd:  +bowel sounds, soft, + suprapubic tenderness  Ext:  No pedal edema,  Pedal pulses 2+ B        Assessment:     1. Postmenopausal bleeding    2. Endometrial thickening on ultrasound    Assessment & Plan  Postmenopausal bleeding  Postmenopausal bleeding with  thickened endometrium. Differential includes hormonal imbalance or endometrial cancer. Recent biopsy negative for malignancy, but further evaluation needed.  - Continue with appointment with Nurse Practitioner Mai Kimble on April 24.  - Anticipate possible repeat endometrial biopsy.  - Discuss potential D&C  - Advise monitoring for heavy bleeding or symptoms of lightheadedness and dizziness; seek emergency care if these occur.        I am providing care as part of an ongoing, longitudinal care relationship.    Return for see Eleanor Kimble as planned next week.

## 2025-04-29 ENCOUNTER — APPOINTMENT (OUTPATIENT)
Dept: CT IMAGING | Age: 55
End: 2025-04-29
Attending: PHYSICIAN ASSISTANT
Payer: COMMERCIAL

## 2025-04-29 ENCOUNTER — HOSPITAL ENCOUNTER (EMERGENCY)
Age: 55
Discharge: HOME | End: 2025-04-29
Attending: EMERGENCY MEDICINE
Payer: COMMERCIAL

## 2025-04-29 VITALS
BODY MASS INDEX: 30.36 KG/M2 | RESPIRATION RATE: 16 BRPM | WEIGHT: 165 LBS | HEIGHT: 62 IN | SYSTOLIC BLOOD PRESSURE: 123 MMHG | OXYGEN SATURATION: 100 % | HEART RATE: 54 BPM | TEMPERATURE: 99 F | DIASTOLIC BLOOD PRESSURE: 84 MMHG

## 2025-04-29 DIAGNOSIS — R10.30 LOWER ABDOMINAL PAIN: Primary | ICD-10-CM

## 2025-04-29 DIAGNOSIS — D25.9 UTERINE LEIOMYOMA, UNSPECIFIED LOCATION: ICD-10-CM

## 2025-04-29 LAB
ALBUMIN SERPL-MCNC: 4.4 G/DL (ref 3.2–4.8)
ALBUMIN/GLOB SERPL: 1.4 {RATIO} (ref 1–2)
ALP LIVER SERPL-CCNC: 72 U/L (ref 41–108)
ALT SERPL-CCNC: 22 U/L (ref 10–49)
ANION GAP SERPL CALC-SCNC: 6 MMOL/L (ref 0–18)
AST SERPL-CCNC: 16 U/L (ref ?–34)
BASOPHILS # BLD AUTO: 0.03 X10(3) UL (ref 0–0.2)
BASOPHILS NFR BLD AUTO: 0.4 %
BILIRUB SERPL-MCNC: 0.4 MG/DL (ref 0.3–1.2)
BILIRUB UR QL STRIP.AUTO: NEGATIVE
BUN BLD-MCNC: 17 MG/DL (ref 9–23)
CALCIUM BLD-MCNC: 9.4 MG/DL (ref 8.7–10.6)
CHLORIDE SERPL-SCNC: 105 MMOL/L (ref 98–112)
CLARITY UR REFRACT.AUTO: CLEAR
CO2 SERPL-SCNC: 30 MMOL/L (ref 21–32)
COLOR UR AUTO: YELLOW
CREAT BLD-MCNC: 0.7 MG/DL (ref 0.55–1.02)
EGFRCR SERPLBLD CKD-EPI 2021: 103 ML/MIN/1.73M2 (ref 60–?)
EOSINOPHIL # BLD AUTO: 0.35 X10(3) UL (ref 0–0.7)
EOSINOPHIL NFR BLD AUTO: 4.5 %
ERYTHROCYTE [DISTWIDTH] IN BLOOD BY AUTOMATED COUNT: 12.5 %
GLOBULIN PLAS-MCNC: 3.1 G/DL (ref 2–3.5)
GLUCOSE BLD-MCNC: 90 MG/DL (ref 70–99)
GLUCOSE UR STRIP.AUTO-MCNC: NEGATIVE MG/DL
HCT VFR BLD AUTO: 42.3 % (ref 35–48)
HGB BLD-MCNC: 14.2 G/DL (ref 12–16)
IMM GRANULOCYTES # BLD AUTO: 0.01 X10(3) UL (ref 0–1)
IMM GRANULOCYTES NFR BLD: 0.1 %
KETONES UR STRIP.AUTO-MCNC: NEGATIVE MG/DL
LEUKOCYTE ESTERASE UR QL STRIP.AUTO: NEGATIVE
LYMPHOCYTES # BLD AUTO: 2.77 X10(3) UL (ref 1–4)
LYMPHOCYTES NFR BLD AUTO: 35.7 %
MCH RBC QN AUTO: 30.3 PG (ref 26–34)
MCHC RBC AUTO-ENTMCNC: 33.6 G/DL (ref 31–37)
MCV RBC AUTO: 90.2 FL (ref 80–100)
MONOCYTES # BLD AUTO: 0.69 X10(3) UL (ref 0.1–1)
MONOCYTES NFR BLD AUTO: 8.9 %
NEUTROPHILS # BLD AUTO: 3.9 X10 (3) UL (ref 1.5–7.7)
NEUTROPHILS # BLD AUTO: 3.9 X10(3) UL (ref 1.5–7.7)
NEUTROPHILS NFR BLD AUTO: 50.4 %
NITRITE UR QL STRIP.AUTO: NEGATIVE
OSMOLALITY SERPL CALC.SUM OF ELEC: 293 MOSM/KG (ref 275–295)
PH UR STRIP.AUTO: 6 [PH] (ref 5–8)
PLATELET # BLD AUTO: 224 10(3)UL (ref 150–450)
POTASSIUM SERPL-SCNC: 3.9 MMOL/L (ref 3.5–5.1)
PROT SERPL-MCNC: 7.5 G/DL (ref 5.7–8.2)
PROT UR STRIP.AUTO-MCNC: NEGATIVE MG/DL
RBC # BLD AUTO: 4.69 X10(6)UL (ref 3.8–5.3)
SODIUM SERPL-SCNC: 141 MMOL/L (ref 136–145)
SP GR UR STRIP.AUTO: 1.02 (ref 1–1.03)
UROBILINOGEN UR STRIP.AUTO-MCNC: 0.2 MG/DL
WBC # BLD AUTO: 7.8 X10(3) UL (ref 4–11)

## 2025-04-29 PROCEDURE — 81001 URINALYSIS AUTO W/SCOPE: CPT | Performed by: PHYSICIAN ASSISTANT

## 2025-04-29 PROCEDURE — 96361 HYDRATE IV INFUSION ADD-ON: CPT

## 2025-04-29 PROCEDURE — 99284 EMERGENCY DEPT VISIT MOD MDM: CPT

## 2025-04-29 PROCEDURE — 80053 COMPREHEN METABOLIC PANEL: CPT | Performed by: PHYSICIAN ASSISTANT

## 2025-04-29 PROCEDURE — 81015 MICROSCOPIC EXAM OF URINE: CPT | Performed by: PHYSICIAN ASSISTANT

## 2025-04-29 PROCEDURE — 85025 COMPLETE CBC W/AUTO DIFF WBC: CPT | Performed by: PHYSICIAN ASSISTANT

## 2025-04-29 PROCEDURE — 74177 CT ABD & PELVIS W/CONTRAST: CPT | Performed by: PHYSICIAN ASSISTANT

## 2025-04-29 PROCEDURE — 96374 THER/PROPH/DIAG INJ IV PUSH: CPT

## 2025-04-29 RX ORDER — HYDROCODONE BITARTRATE AND ACETAMINOPHEN 5; 325 MG/1; MG/1
1-2 TABLET ORAL EVERY 6 HOURS PRN
Qty: 10 TABLET | Refills: 0 | Status: SHIPPED | OUTPATIENT
Start: 2025-04-29 | End: 2025-05-04

## 2025-04-29 RX ORDER — HYDROCODONE BITARTRATE AND ACETAMINOPHEN 5; 325 MG/1; MG/1
1 TABLET ORAL ONCE
Refills: 0 | Status: COMPLETED | OUTPATIENT
Start: 2025-04-29 | End: 2025-04-29

## 2025-04-29 RX ORDER — KETOROLAC TROMETHAMINE 15 MG/ML
15 INJECTION, SOLUTION INTRAMUSCULAR; INTRAVENOUS ONCE
Status: COMPLETED | OUTPATIENT
Start: 2025-04-29 | End: 2025-04-29

## 2025-04-29 RX ORDER — IOPAMIDOL 755 MG/ML
80 INJECTION, SOLUTION INTRAVASCULAR
Status: COMPLETED | OUTPATIENT
Start: 2025-04-29 | End: 2025-04-29

## 2025-04-29 RX ORDER — ONDANSETRON 2 MG/ML
4 INJECTION INTRAMUSCULAR; INTRAVENOUS ONCE
Status: DISCONTINUED | OUTPATIENT
Start: 2025-04-29 | End: 2025-04-29

## 2025-04-29 NOTE — ED INITIAL ASSESSMENT (HPI)
Pt presents with lower abd pain, left hip pain and leg pain. Hx of endometriosis and fibromyalgia. Pt states she recently saw her gyne and is suppposed to have surgery in may but she is having worsening pain.

## 2025-04-29 NOTE — ED PROVIDER NOTES
Patient Seen in: [facility] [place]      History     Chief Complaint   Patient presents with    Abdomen/Flank Pain    Eval-G     Stated Complaint: RLQ pain    Subjective:   HPI    Patient with history of endometriosis and recent postmenopausal vaginal bleeding presents complaining of lower abdominal pain.  States that she is scheduled for surgery on May 7 but is unclear if she is having a hysterectomy.  Per the medical records it may be for biopsy and possible D&C.  States that she was advised that if her pain became worse to come to the ER.  States that she has not had any further vaginal bleeding but that this pain that she has been experiencing has increased in intensity and severity.  Denies fevers, chills, vomiting, diarrhea.  Denies UTI symptoms.  Denies any other complaints or concerns at this time.    History of Present Illness               Objective:     Past Medical History:    Anemia    Essential hypertension    Fatigue    Fibroids    Fibromyalgia    Flatulence/gas pain/belching    H. pylori infection    on EGD    Headache disorder    High cholesterol    History of gestational diabetes    Humeral distal fracture    L    IFG (impaired fasting glucose)    Leg swelling    Migraines    Mixed hyperlipidemia    Pain with bowel movements    Sleep disturbance    Wears glasses    Weight loss              Past Surgical History:   Procedure Laterality Date    Cholecystectomy      Colonoscopy  2024    int hemorrhoid, repeat 10 yrs, Dr. Lewis, [facility]    Eeh amb cologuard (results only)      Egd  2024    normal, Dr. Lewis, [facility]    Endometrial biopsy - jar(s): 2  2024    benign, Eleanor Gall    Orif humerus fracture Left     Prior classical                   Social History     Socioeconomic History    Marital status:     Number of children: 3   Tobacco Use    Smoking status: Never    Smokeless tobacco: Never   Vaping Use    Vaping status: Never Used    Substance and Sexual Activity    Alcohol use: Yes     Comment: wine on special occasions few times a yr, never a problem    Drug use: No    Sexual activity: Not Currently     Partners: Male     Comment: in menopause   Other Topics Concern    Caffeine Concern No    Exercise Yes     Comment: 3 x a week.walk    Seat Belt Yes     Social Drivers of Health     Food Insecurity: No Food Insecurity (4/7/2025)    NCSS - Food Insecurity     Worried About Running Out of Food in the Last Year: No     Ran Out of Food in the Last Year: No   Transportation Needs: No Transportation Needs (4/7/2025)    NCSS - Transportation     Lack of Transportation: No   Housing Stability: Unknown (4/7/2025)    NCSS - Housing/Utilities     Has Housing: Yes     Worried About Losing Housing: No                                Physical Exam     ED Triage Vitals   BP 04/29/25 1708 (!) 142/91   Pulse 04/29/25 1708 70   Resp 04/29/25 1708 15   Temp 04/29/25 1708 98.5 °F (36.9 °C)   Temp src 04/29/25 1708 Oral   SpO2 04/29/25 1708 97 %   O2 Device 04/29/25 1914 None (Room air)       Current Vitals:   Vital Signs  BP: 123/84  Pulse: 54  Resp: 16  Temp: 98.5 °F (36.9 °C)  Temp src: Oral    Oxygen Therapy  SpO2: 100 %  O2 Device: None (Room air)        Physical Exam  Vitals and nursing note reviewed.   Constitutional:       Appearance: She is well-developed.   HENT:      Head: Normocephalic.   Eyes:      Extraocular Movements: Extraocular movements intact.   Cardiovascular:      Rate and Rhythm: Normal rate and regular rhythm.   Pulmonary:      Effort: Pulmonary effort is normal.      Breath sounds: Normal breath sounds.   Abdominal:      General: Abdomen is flat.      Palpations: Abdomen is rigid.      Tenderness: There is abdominal tenderness in the right lower quadrant, suprapubic area and left lower quadrant. There is no guarding or rebound.   Skin:     General: Skin is warm and dry.   Neurological:      General: No focal deficit present.      Mental  Status: She is alert.   Psychiatric:         Mood and Affect: Mood normal.         Behavior: Behavior normal.             Physical Exam                ED Course     Labs Reviewed   URINALYSIS WITH CULTURE REFLEX - Abnormal; Notable for the following components:       Result Value    Blood Urine Trace-Intact (*)     All other components within normal limits   COMP METABOLIC PANEL (14) - Normal   UA MICROSCOPIC ONLY, URINE - Normal   CBC WITH DIFFERENTIAL WITH PLATELET          Results            CT ABDOMEN+PELVIS(CONTRAST ONLY)(CPT=74177)  Result Date: 4/29/2025  PROCEDURE:  CT ABDOMEN+PELVIS (CONTRAST ONLY) (CPT=74177)  COMPARISON:  [place], CT, CT ABDOMEN+PELVIS(CPT=74176), 11/27/2024, 5:37 PM.  INDICATIONS:  RLQ pain  TECHNIQUE:  CT scanning was performed from the dome of the diaphragm to the pubic symphysis with non-ionic intravenous contrast material. Post contrast coronal MPR imaging was performed.  Dose reduction techniques were used. Dose information is transmitted to the ACR (American College of Radiology) NRDR (National Radiology Data Registry) which includes the Dose Index Registry.  PATIENT STATED HISTORY:(As transcribed by Technologist)   RLQ pain   CONTRAST USED:  80cc of Isovue 370  FINDINGS:  LUNG BASE:  Atelectasis. LIVER:  Homogeneous enhancement. BILIARY:  Cholecystectomy.  No biliary ductal dilatation. PANCREAS:  Homogeneous enhancement. SPLEEN:  Normal caliber. KIDNEYS:  No hydronephrosis or suspicious renal mass. ADRENALS:  Normal. AORTA/VASCULAR:  No aneurysm. RETROPERITONEUM:  No enlarged adenopathy. BOWEL/MESENTERY:  Normal caliber appendix. Uncomplicated colonic diverticulosis ABDOMINAL WALL:  Fat containing umbilical hernia.. PELVIC ORGANS:  Uterine fibroids.  Bilateral parapelvic varices.  BONES:  Mild degenerative changes in the lower lumbar facets.             CONCLUSION:  Normal caliber appendix.  There is no CT evidence for acute appendicitis.  Uterine fibroids.  LOCATION:  [id number]    Dictated by (CST): Fina Vanegas MD on 4/29/2025 at 7:00 PM     Finalized by (CST): Fina Vanegas MD on 4/29/2025 at 7:03 PM                            MDM      Differential diagnosis includes but is not limited to endometriosis, colitis, diverticulitis, UTI, appendicitis.    Patient well-appearing, nontoxic.  Abdominal exam is benign. An IV was started and labs were drawn.  CBC and CMP unremarkable.  UA shows no evidence UTI.  CT scan shows uterine fibroids but otherwise no acute process.  Discussed case with Dr. Hines who has seen and evaluated patient personally and agrees with plan. He discussed all results and plan with patient.  Advised follow-up with Gyne and provided specific return precautions.  Patient verbalized understanding/agreement of plan.         Medical Decision Making      Disposition and Plan     Clinical Impression:  1. Lower abdominal pain    2. Uterine leiomyoma, unspecified location         Disposition:  Discharge  4/29/2025  7:27 pm    Follow-up:  Valerie Galdamez MD  [address] [place] [address] [address]  [facility] [address]  [phone]    Follow up in 3 day(s)      [facility] in [place]  [address] [place] [id number]  [phone]  Follow up  As needed, If symptoms worsen    Alessia Pizano MD  [address] [facility] [address]  [facility] [address]  [phone]    Call in 1 day(s)            Medications Prescribed:  Discharge Medication List as of 4/29/2025  7:40 PM        START taking these medications    Details   HYDROcodone-acetaminophen 5-325 MG Oral Tab Take 1-2 tablets by mouth every 6 (six) hours as needed., Normal, Disp-10 tablet, R-0             Supplementary Documentation:

## 2025-04-30 ENCOUNTER — TELEPHONE (OUTPATIENT)
Dept: FAMILY MEDICINE CLINIC | Facility: CLINIC | Age: 55
End: 2025-04-30

## 2025-04-30 ENCOUNTER — TELEPHONE (OUTPATIENT)
Dept: OBGYN CLINIC | Facility: CLINIC | Age: 55
End: 2025-04-30

## 2025-04-30 NOTE — TELEPHONE ENCOUNTER
Patient is scheduled with ob/gyne tomorrow    Future Appointments   Date Time Provider Department Center   5/1/2025 12:30 PM Mai Kimble APN [facility] [address]   5/7/2025  7:40 AM [facility]   5/7/2025 12:15 PM Alessia Pizano MD [facility] [address]

## 2025-04-30 NOTE — TELEPHONE ENCOUNTER
Pt was seen in the ER and was advised to call our office and schedule follow up appointment with Dr. Pizano. Please advise, thank you.

## 2025-04-30 NOTE — TELEPHONE ENCOUNTER
4/29/2025- ED Visit for abdominal pain  CT scan shows uterine fibroids but otherwise no acute process     Patient is post-menopausal since 2022.  4/13/25 started experiencing abdominal cramping.  4/14/25 post-menopausal vaginal bleeding for 1 day.  4/17/25 saw PCP, instructed to follow up with gyne surgeon  5/7/2025- surgery consult scheduled with Dr. Pizano  Abdominal pain is 7/10, slight improvement with pain medication.  Denies vaginal bleeding.  ER precautions for pain/bleeding reviewed.  Patient verbalized understanding.  Appt scheduled on 5/1/2025.

## 2025-04-30 NOTE — DISCHARGE INSTRUCTIONS
Return for new/worsening symptoms (ie increased pain, fevers, vomiting, weakness, etc)    Take the pain medication as prescribed.  It can make you drowsy, so be careful when taking it.

## 2025-04-30 NOTE — TELEPHONE ENCOUNTER
Patient seen in the ER yesterday for lower abdominal pain, CT scan showed uterine fibroid. Patient advised to follow up with gyne.    Will await gyne recommendations.

## 2025-04-30 NOTE — TELEPHONE ENCOUNTER
Future Appointments   Date Time Provider Department Center   5/7/2025  7:40 AM [facility]  [facility]   5/7/2025 12:15 PM Alessia Pizano MD [facility] [address]       Patient calling to inform PCP that patient went to ED yesterday due to abdominal pain. Patient also left message with OB/GYN, will await recommendations from either provider.

## 2025-05-01 NOTE — TELEPHONE ENCOUNTER
Call to patient per provider request, Luxembourger-speaking RN initiated call-  Patient still experiencing strong abdominal pain and has [place] medication from ER visit.  Provider recommendations reviewed with patient, discussed okay to continue to monitor symptoms until office visit with MD on 5/7/2025.  ER precautions for pain/bleeding reviewed.  Instructed to contact PCP for [place] refill to hold over until office visit.  Patient to keep office visit on 5/7/2025 with MD for surgery consult.  Office visit for today cancelled.  Patient verbalized understanding.

## 2025-05-02 ENCOUNTER — TELEPHONE (OUTPATIENT)
Dept: FAMILY MEDICINE CLINIC | Facility: CLINIC | Age: 55
End: 2025-05-02

## 2025-05-02 DIAGNOSIS — D25.9 UTERINE LEIOMYOMA, UNSPECIFIED LOCATION: ICD-10-CM

## 2025-05-02 DIAGNOSIS — N95.0 POSTMENOPAUSAL BLEEDING: Primary | ICD-10-CM

## 2025-05-02 RX ORDER — CYCLOBENZAPRINE HCL 5 MG
5 TABLET ORAL 2 TIMES DAILY PRN
Qty: 20 TABLET | Refills: 0 | Status: SHIPPED | OUTPATIENT
Start: 2025-05-02

## 2025-05-02 RX ORDER — HYDROCODONE BITARTRATE AND ACETAMINOPHEN 5; 325 MG/1; MG/1
1 TABLET ORAL 2 TIMES DAILY PRN
Qty: 10 TABLET | Refills: 0 | Status: SHIPPED | OUTPATIENT
Start: 2025-05-02

## 2025-05-02 NOTE — TELEPHONE ENCOUNTER
Will you refill [place] [facility]?     Seen in ER on 4/29/25 for abdominal pain. CT scan showed uterine fibroid. She is scheduled with OB/GYN 5/7/25 and requesting refill in the meantime.    Per [place] , dispensed 4/29/25 #10 Rx'd by Dr. Hines from ER

## 2025-05-02 NOTE — TELEPHONE ENCOUNTER
Future Appointments   Date Time Provider Department Center   5/7/2025  7:40 AM  [facility]  [facility]   5/7/2025 12:15 PM Alessia Pizano MD [facility] [address]     Patient calling, requesting short term Norco prescription. Patient given [place] by ED provider, patient to see OB/GYN for surgery consult on 5-7. Appointment on 5-1 with OB APN was canceled by office, recommended for her to wait on 5-7 visit. Patient referred to PCP for poss medication, please advise

## 2025-05-07 ENCOUNTER — OFFICE VISIT (OUTPATIENT)
Dept: OBGYN CLINIC | Facility: CLINIC | Age: 55
End: 2025-05-07
Payer: COMMERCIAL

## 2025-05-07 ENCOUNTER — HOSPITAL ENCOUNTER (OUTPATIENT)
Dept: MAMMOGRAPHY | Facility: HOSPITAL | Age: 55
Discharge: HOME | End: 2025-05-07
Attending: FAMILY MEDICINE
Payer: COMMERCIAL

## 2025-05-07 VITALS
SYSTOLIC BLOOD PRESSURE: 120 MMHG | HEART RATE: 60 BPM | DIASTOLIC BLOOD PRESSURE: 68 MMHG | HEIGHT: 62 IN | BODY MASS INDEX: 30.36 KG/M2 | WEIGHT: 165 LBS

## 2025-05-07 DIAGNOSIS — N64.4 BREAST TENDERNESS: ICD-10-CM

## 2025-05-07 DIAGNOSIS — R93.89 THICKENED ENDOMETRIUM: ICD-10-CM

## 2025-05-07 DIAGNOSIS — N95.0 PMB (POSTMENOPAUSAL BLEEDING): Primary | ICD-10-CM

## 2025-05-07 DIAGNOSIS — N80.9 ENDOMETRIOSIS: ICD-10-CM

## 2025-05-07 DIAGNOSIS — R10.2 PELVIC PAIN: ICD-10-CM

## 2025-05-07 DIAGNOSIS — D25.9 UTERINE LEIOMYOMA, UNSPECIFIED LOCATION: ICD-10-CM

## 2025-05-07 PROBLEM — Z98.891 HISTORY OF CESAREAN DELIVERY: Status: ACTIVE | Noted: 2025-05-07

## 2025-05-07 PROCEDURE — 77062 BREAST TOMOSYNTHESIS BI: CPT | Performed by: FAMILY MEDICINE

## 2025-05-07 PROCEDURE — 77066 DX MAMMO INCL CAD BI: CPT | Performed by: FAMILY MEDICINE

## 2025-05-07 PROCEDURE — 99215 OFFICE O/P EST HI 40 MIN: CPT | Performed by: OBSTETRICS & GYNECOLOGY

## 2025-05-07 PROCEDURE — 76642 ULTRASOUND BREAST LIMITED: CPT | Performed by: FAMILY MEDICINE

## 2025-05-07 RX ORDER — IBUPROFEN 600 MG/1
600 TABLET, FILM COATED ORAL EVERY 6 HOURS PRN
Qty: 30 TABLET | Refills: 3 | Status: SHIPPED | OUTPATIENT
Start: 2025-05-07

## 2025-05-07 NOTE — PROGRESS NOTES
[facility]  Obstetrics and Gynecology  Follow Up Progress Note    Subjective:     Kateryna Han is a 55 year old  female who was last seen in office 2024 for uterine fibroid with thick endometrium and presents with c/o PMB. The patient reports  she had PMB for 2 days and most was on  with light spotting on 4/15/2025. Menopause since . She reports increased lower abdominal pain and was given Norco 2/2 pain.  The patient was seen in the emergency room on 2025 secondary to right lower quadrant pain.  Her CT abdomen/pelvis was noted for uterus with fibroid.  The patient had a pelvic ultrasound on 2025.  Her pelvic ultrasound was noted for thickened endometrium and a small uterine fibroid. She reports continues to have pain. She reports pain with movement. However, overall did get better but noted with movements. She reports pain radiates towards her lower back and then towards her legs.  She denies further episodes of postmenopausal bleeding.     Of note, the patient had a prior pelvic ultrasound noted for thickened endometrium.  The patient had endometrial biopsy in 2024 that was normal.  The patient also reports history of endometriosis.  She was told that there was extensive scarring at the time of her  section.  History of  section x 1.    Review of Systems:  General: no complaints per category. See HPI for additional information.   Breast: no complaints per category. See HPI for additional information.   Respiratory: no complaints per category. See HPI for additional information.   Cardiovascular: no complaints per category. See HPI for additional information.   GI: no complaints per category. See HPI for additional information.   : no complaints per category. See HPI for additional information.   Heme: no complaints per category. See HPI for additional information.     OB History    Para Term  AB Living   4 3 3 0 1 3   SAB IAB  Ectopic Multiple Live Births   1 0 0 0 3      # Outcome Date GA Lbr [facility]/2nd Weight Sex Type Anes PTL Lv   4 SAB 10/2011 8w0d          3 Term 03/05/05 40w0d  7 lb 8 oz (3.402 kg) M NORMAL SPONT   KASEY   2 Term 11/04/96 38w0d  7 lb 14.1 oz (3.575 kg) M Caesarean   KASEY   1 Term 09/02/93 40w0d  7 lb 13.2 oz (3.55 kg) F NORMAL SPONT   KASEY         Gyne History:     Patient's last menstrual period was 01/22/2022.      Meds:  Current Outpatient Medications on File Prior to Visit   Medication Sig Dispense Refill    HYDROcodone-acetaminophen 5-325 MG Oral Tab Take 1 tablet by mouth 2 (two) times daily as needed (abdominal pain). 10 tablet 0    rosuvastatin 20 MG Oral Tab Take 1 tablet (20 mg total) by mouth nightly. 90 tablet 0    famotidine 20 MG Oral Tab Take 1 tablet (20 mg total) by mouth 2 (two) times daily as needed for Heartburn. 180 tablet 1    Multiple Vitamin (MULTIVITAMIN ADULT OR) Take 1 tablet by mouth daily.      cyclobenzaprine 5 MG Oral Tab Take 1 tablet (5 mg total) by mouth 2 (two) times daily as needed (uterine cramping). Don't drive if it makes you sleepy. (Patient not taking: Reported on 5/7/2025) 20 tablet 0     No current facility-administered medications on file prior to visit.       All:  Allergies   Allergen Reactions    Dipyrone HIVES, RASH and UNKNOWN     A pain med (not an opioid or nsaid)       PMH:  Past Medical History:    Anemia    Essential hypertension    Fatigue    Fibroids    Fibromyalgia    Flatulence/gas pain/belching    H. pylori infection    on EGD    Headache disorder    High cholesterol    History of gestational diabetes    Humeral distal fracture    L    IFG (impaired fasting glucose)    Leg swelling    Migraines    Mixed hyperlipidemia    Pain with bowel movements    Sleep disturbance    Wears glasses    Weight loss       PSH:  Past Surgical History:   Procedure Laterality Date    Cholecystectomy      Colonoscopy  12/13/2024    int hemorrhoid, repeat 10 yrs, Dr. Lewis, [facility]     Amanda cline (results only)      Egd  2024    normal, Dr. Lewis, [facility]    Endometrial biopsy - jar(s): 2  2024    benign, Eleanor Kimble    Orif humerus fracture Left     Prior classical            Objective:     Vitals:    25 1231   BP: 120/68   Pulse: 60   Weight: 165 lb (74.8 kg)   Height: 62\"         Body mass index is 30.18 kg/m².    General: AAO.NAD.   CVS exam: normal peripheral perfusion  Chest: non-labored breathing, no tachypnea   Abdominal exam: soft, nontender, nondistended  Pelvic exam:   VULVA: normal appearing vulva with no masses, tenderness or lesions  PERINEUM:  normal appearing, no lesions   URETHRAL MEATUS:  normal appearing, no lesions   VAGINA: normal appearing vagina with normal color and discharge, no lesions  CERVIX: normal appearing cervix without discharge or lesions  UTERUS: uterus is normal size, shape, consistency and nontender  ADNEXA: normal adnexa in size. + Bilateral tenderness with palpation.  Bilaterally no fullness and no masses  PERIRECTAL:  normal appearing, no lesions   Ext: non-tender, no edema    Labs:    Imaging:  FINDINGS:                UTERUS:  10.4 x 3.8 x 5.4 cm    Endometrium thickness:  17 mm    2.4 x 1.4 x 2.5 cm transmural fibroid of anterior fundus.  RIGHT OVARY:  Not visualized due to bowel gas.  LEFT OVARY:  2.5 x 1.4 x 2.7 cm.    The left ovary appears normal in size, shape, and echogenicity. No significant masses are identified.  CUL-DE-SAC:  Normal.  No fluid or mass.    OTHER:  Negative.                     Impression   CONCLUSION:    1. Thickened endometrium again demonstrated without significant change.  Differential considerations include endometrial neoplasm and hyperplasia.  Given the history of postmenopausal bleeding, endometrial tissue sampling is recommended.  2. Small uterine fibroid.        LOCATION:  [place]        Dictated by (CST): Clay Bright MD on 2025 at 12:42 PM      Finalized by (CST): Kaila  MD Clay on 2025 at 12:45 PM        Final Diagnosis:   A. Endometrium, biopsy:  -Superficial strips of inactive endometrium with areas of stromal breakdown change.  -Fragments of unremarkable endocervical glandular epithelium with mucus and inflammatory cells.      Electronically signed by Annamarie Joseph on 2024 at 1356 CD       FINDINGS:                UTERUS:  10.33 cm x 5.14 cm x 5.57 cm    Endometrium Thickness: 2.14 cm    Intramural fibroid along the right side of the uterine fundus measuring 31 x 29 x 26 mm.  There is heterogenous thickening of the endometrial stripe with an underlying endometrial neoplasm or other etiologies not entirely excluded.  Clinical  correlation recommended.    RIGHT OVARY:  2.49 cm x 1.62 cm x 2.35 cm    The right ovary appears normal in size, shape, and echogenicity. No significant masses are identified.  Blood flow demonstrated.  LEFT OVARY:  2.83 cm x 1.58 cm x 2.82 cm    The left ovary appears normal in size, shape, and echogenicity. No significant masses are identified.  Blood flow demonstrated.  CUL-DE-SAC:  Unremarkable.  No fluid or mass.    OTHER:  Negative.                        Impression   CONCLUSION:       1. There is heterogenous thickening of the endometrial stripe measuring up to 21 mm in thickness with an underlying endometrial neoplasm or other etiologies not entirely excluded.  Clinical correlation recommended.     2. Fundal fibroid as above.     3. Unremarkable ovaries.     Please see above for further details.     LOCATION:  [facility]        Dictated by (CST): Desmond Singh MD on 2024 at 6:11 PM      Finalized by (CST): Desmond Singh MD on 2024 at 6:13 PM         Assessment:     Kateryna Han is a 55 year old  female who presents for postmenopausal bleeding and thickened endometrium        Plan:     Problem List Items Addressed This Visit          Genitourinary and Reproductive    Endometriosis     Other Visit Diagnoses          PMB (postmenopausal bleeding)    -  Primary      Thickened endometrium          Pelvic pain          Uterine leiomyoma, unspecified location                  Postmenopausal bleeding  - Associated with thick endometrium  - Pelvic ultrasound reviewed and discussed with patient.  Discussed with patient that her pelvic ultrasound is noted for thickened endometrium.  Recommend for patient to have hysteroscopic endometrial sampling.  Patient with a history of thickened endometrium and September 2024 status post endometrial biopsy that was benign.  However, discussed with patient that there is a possibility that her endometrium might be thickened due to endometrial polyp.  Discussed with patient the possible etiologies of thickened endometrium.  Discussed with patient the risk of hyperplasia and/or malignancy.  Therefore, recommend further evaluation with hysteroscopic endometrial sampling.  - pt offered outpatient procedure including dilation, hysteroscopy, hysteroscopic polypectomy, hysteroscopic directed endometrial sampling and possible curettage   - discussion held with the patient about risks, benefits and alternatives of procedure including but not limited to risk of infection, bleeding, injury and exploratory laparotomy   - pt agreeable and request to be scheduled for surgery   - pt will be contacted with date and time of scheduled surgery   - pre operative instructions provided   - all questions and concerns were addressed   Uterine fibroid  - Pelvic ultrasound reviewed discussed with patient.  Small uterine fibroid noted.  Discussed with patient that this is likely not the cause of her pelvic pain.  However, patient offered further definitive surgical management including hysterectomy.  Discussed with patient risks, benefits and alternatives to hysterectomy including but not limited to risk of infection, bleeding and injury.  Discussed with patient that hysterectomy could be technically more difficult due to her  history of endometriosis and scarring noted at the time of  section.  Therefore, if the patient elected for definitive surgical management with hysterectomy then recommend for patient to be referred to minimally basic gynecological surgery due to patient's complex surgical history.  However, patient declines hysterectomy at this time.  Pelvic pain  - History of endometriosis but patient has been postmenopausal since   - Discussed with patient that her pelvic pain may or may not be related to her thickened endometrium that could be causing uterine contractions against a pelvis that could have dense adhesions.  Therefore, patient would like to see how her pelvic pain feels after her hysteroscopic endometrial sampling.  - Recommend Motrin 600 mg as needed for pain and to be taken with food.  -Precautions provided    All of the findings and plan were discussed with the patient.  She notes understanding and agrees with the plan of care.  All questions were answered to the best of my ability at this time.      Total patient time was 60 minutes in evaluation, consultation, and coordination of care. This included face to face and non-face to face actions. The patient's questions and concerns were addressed.       RTC in 2 weeks following surgery or sooner if needed     Alessia Pizano MD   [facility] - OBGYN       Discussed with patient that there will not be further notification of normal or benign results other than receiving results on [employer]. A [employer] message or telephone call will be placed by the physician and/or office staff if results are abnormal.     Note to patient and family   The 21st Century Cures Act makes medical notes available to patients in the interest of transparency.  However, please be advised that this is a medical document.  It is intended as loba-cm-ipkx communication.  It is written and medical language may contain abbreviations or verbiage that are technical and unfamiliar.  It may appear  blunt or direct.  Medical documents are intended to carry relevant information, facts as evident, and the clinical opinion of the practitioner.        This note could include assistance by Dragon voice recognition. Errors in content may be related to improper recognition by the system; efforts to review and correct have been done but errors may still exist.

## 2025-05-09 ENCOUNTER — TELEPHONE (OUTPATIENT)
Dept: OBGYN CLINIC | Facility: CLINIC | Age: 55
End: 2025-05-09

## 2025-05-09 DIAGNOSIS — N95.0 POSTMENOPAUSAL BLEEDING: Primary | ICD-10-CM

## 2025-05-09 DIAGNOSIS — R93.89 THICKENED ENDOMETRIUM: ICD-10-CM

## 2025-05-09 NOTE — TELEPHONE ENCOUNTER
Surgery scheduled for 6/12/25 at 930  Post op   Future Appointments   Date Time Provider Department Center   7/3/2025  1:45 PM Alessia Pizano MD [facility] [place]     Orders entered  Added to calendar  PA -   Status:  APPROVED    Authorization #:  [id number]    CPT 09070

## 2025-05-09 NOTE — TELEPHONE ENCOUNTER
----- Message from Alessia Pizano sent at 5/7/2025  5:34 PM CDT -----  Regarding: Please schedule surgery  Surgeon: Dr. Alessia Pizano Assistant: none Type of Admit: Hospital outpatient, does not require admission following surgery Procedure Location: Main ORPreOp Dx: Postmenopausal bleeding, thickened endometriumProcedure: Dilation, hysteroscopy, hysteroscopic endometrial sampling and curettage Anesthesia: MACSpecial Equipment/Comments: [employer] TRUCLEAR hysteroscopy with blades Antibiotics: NonePre Op Orders: initiate my Pre-op standing orders, if none exist please use [facility]'s Standing Order Labs: per protocol Medical clearance: none

## 2025-05-15 NOTE — TELEPHONE ENCOUNTER
Moved case to 6/13/25 at 1130 am   Post po   Future Appointments   Date Time Provider Department Center   7/3/2025  1:45 PM Alessia Pizano MD [facility] [place]     Orders updated  Calendar updated  PA - see below

## 2025-05-27 ENCOUNTER — TELEPHONE (OUTPATIENT)
Dept: OBGYN CLINIC | Facility: CLINIC | Age: 55
End: 2025-05-27

## 2025-05-27 NOTE — TELEPHONE ENCOUNTER
6/13/25- HYSTEROSCOPY, POSSIBLE EXCISION FIBROIDS/POLYP scheduled    Received medications in April, has had no bleeding other than accessional spotting.  Most recently on 5/23/25 passed a blood clot about 2-3 cm.  Patient taking cyclobenzaprine, hydrocodone and ibuprofen for pain. Also uses hot compresses. Pain is well controlled.  Discussed medications are for pain and not to regulate bleeding.  Instructed to call office if experiencing increased bleeding or passing of clots.  Patient verbalized understanding.

## 2025-05-27 NOTE — TELEPHONE ENCOUNTER
Pt called stating she started bleeding Friday 05/23 and took medication to stop bleeding. Pt stated bleeding did stop after she took medication and just wanted to let  Know in case that changes anything with surgery. Pt is requesting to speak to a nurse regarding her concerns, please advise, thank you.

## 2025-06-11 ENCOUNTER — LABORATORY ENCOUNTER (OUTPATIENT)
Dept: LAB | Age: 55
End: 2025-06-11
Attending: OBSTETRICS & GYNECOLOGY
Payer: COMMERCIAL

## 2025-06-11 DIAGNOSIS — Z01.818 PRE-OP TESTING: ICD-10-CM

## 2025-06-11 DIAGNOSIS — N95.0 POSTMENOPAUSAL BLEEDING: ICD-10-CM

## 2025-06-11 LAB
ERYTHROCYTE [DISTWIDTH] IN BLOOD BY AUTOMATED COUNT: 12.5 %
HCT VFR BLD AUTO: 41.2 % (ref 35–48)
HGB BLD-MCNC: 13.7 G/DL (ref 12–16)
MCH RBC QN AUTO: 29.3 PG (ref 26–34)
MCHC RBC AUTO-ENTMCNC: 33.3 G/DL (ref 31–37)
MCV RBC AUTO: 88.2 FL (ref 80–100)
PLATELET # BLD AUTO: 207 10(3)UL (ref 150–450)
RBC # BLD AUTO: 4.67 X10(6)UL (ref 3.8–5.3)
WBC # BLD AUTO: 6.1 X10(3) UL (ref 4–11)

## 2025-06-11 PROCEDURE — 85027 COMPLETE CBC AUTOMATED: CPT

## 2025-06-11 PROCEDURE — 36415 COLL VENOUS BLD VENIPUNCTURE: CPT

## 2025-06-13 ENCOUNTER — ANESTHESIA (OUTPATIENT)
Dept: SURGERY | Facility: HOSPITAL | Age: 55
End: 2025-06-13
Payer: COMMERCIAL

## 2025-06-13 ENCOUNTER — ANESTHESIA EVENT (OUTPATIENT)
Dept: SURGERY | Facility: HOSPITAL | Age: 55
End: 2025-06-13
Payer: COMMERCIAL

## 2025-06-13 ENCOUNTER — HOSPITAL ENCOUNTER (OUTPATIENT)
Facility: HOSPITAL | Age: 55
Setting detail: HOSPITAL OUTPATIENT SURGERY
Discharge: HOME | End: 2025-06-13
Attending: OBSTETRICS & GYNECOLOGY | Admitting: OBSTETRICS & GYNECOLOGY
Payer: COMMERCIAL

## 2025-06-13 VITALS
BODY MASS INDEX: 30.55 KG/M2 | TEMPERATURE: 97 F | RESPIRATION RATE: 16 BRPM | WEIGHT: 166 LBS | HEIGHT: 62 IN | HEART RATE: 58 BPM | DIASTOLIC BLOOD PRESSURE: 76 MMHG | SYSTOLIC BLOOD PRESSURE: 117 MMHG | OXYGEN SATURATION: 98 %

## 2025-06-13 DIAGNOSIS — N95.0 POSTMENOPAUSAL BLEEDING: Primary | ICD-10-CM

## 2025-06-13 DIAGNOSIS — Z01.818 PRE-OP TESTING: ICD-10-CM

## 2025-06-13 DIAGNOSIS — R93.89 THICKENED ENDOMETRIUM: ICD-10-CM

## 2025-06-13 PROBLEM — Z86.32 HISTORY OF GESTATIONAL DIABETES: Status: RESOLVED | Noted: 2023-09-29 | Resolved: 2025-06-13

## 2025-06-13 PROBLEM — Z98.891 HISTORY OF CESAREAN DELIVERY: Status: RESOLVED | Noted: 2025-05-07 | Resolved: 2025-06-13

## 2025-06-13 LAB — B-HCG UR QL: NEGATIVE

## 2025-06-13 PROCEDURE — 58558 HYSTEROSCOPY BIOPSY: CPT | Performed by: OBSTETRICS & GYNECOLOGY

## 2025-06-13 RX ORDER — HYDROMORPHONE HYDROCHLORIDE 1 MG/ML
0.2 INJECTION, SOLUTION INTRAMUSCULAR; INTRAVENOUS; SUBCUTANEOUS EVERY 5 MIN PRN
Status: DISCONTINUED | OUTPATIENT
Start: 2025-06-13 | End: 2025-06-13

## 2025-06-13 RX ORDER — DIPHENHYDRAMINE HYDROCHLORIDE 50 MG/ML
12.5 INJECTION, SOLUTION INTRAMUSCULAR; INTRAVENOUS AS NEEDED
Status: DISCONTINUED | OUTPATIENT
Start: 2025-06-13 | End: 2025-06-13

## 2025-06-13 RX ORDER — LIDOCAINE HYDROCHLORIDE 10 MG/ML
INJECTION, SOLUTION EPIDURAL; INFILTRATION; INTRACAUDAL; PERINEURAL AS NEEDED
Status: DISCONTINUED | OUTPATIENT
Start: 2025-06-13 | End: 2025-06-13 | Stop reason: SURG

## 2025-06-13 RX ORDER — LABETALOL HYDROCHLORIDE 5 MG/ML
5 INJECTION, SOLUTION INTRAVENOUS EVERY 5 MIN PRN
Status: DISCONTINUED | OUTPATIENT
Start: 2025-06-13 | End: 2025-06-13

## 2025-06-13 RX ORDER — NALOXONE HYDROCHLORIDE 0.4 MG/ML
80 INJECTION, SOLUTION INTRAMUSCULAR; INTRAVENOUS; SUBCUTANEOUS AS NEEDED
Status: DISCONTINUED | OUTPATIENT
Start: 2025-06-13 | End: 2025-06-13

## 2025-06-13 RX ORDER — KETOROLAC TROMETHAMINE 30 MG/ML
INJECTION, SOLUTION INTRAMUSCULAR; INTRAVENOUS AS NEEDED
Status: DISCONTINUED | OUTPATIENT
Start: 2025-06-13 | End: 2025-06-13 | Stop reason: SURG

## 2025-06-13 RX ORDER — SODIUM CHLORIDE, SODIUM LACTATE, POTASSIUM CHLORIDE, CALCIUM CHLORIDE 600; 310; 30; 20 MG/100ML; MG/100ML; MG/100ML; MG/100ML
INJECTION, SOLUTION INTRAVENOUS CONTINUOUS
Status: DISCONTINUED | OUTPATIENT
Start: 2025-06-13 | End: 2025-06-13

## 2025-06-13 RX ORDER — HYDROMORPHONE HYDROCHLORIDE 1 MG/ML
0.6 INJECTION, SOLUTION INTRAMUSCULAR; INTRAVENOUS; SUBCUTANEOUS EVERY 5 MIN PRN
Status: DISCONTINUED | OUTPATIENT
Start: 2025-06-13 | End: 2025-06-13

## 2025-06-13 RX ORDER — ONDANSETRON 2 MG/ML
4 INJECTION INTRAMUSCULAR; INTRAVENOUS EVERY 6 HOURS PRN
Status: DISCONTINUED | OUTPATIENT
Start: 2025-06-13 | End: 2025-06-13

## 2025-06-13 RX ORDER — MEPERIDINE HYDROCHLORIDE 25 MG/ML
12.5 INJECTION INTRAMUSCULAR; INTRAVENOUS; SUBCUTANEOUS AS NEEDED
Status: DISCONTINUED | OUTPATIENT
Start: 2025-06-13 | End: 2025-06-13

## 2025-06-13 RX ORDER — HYDROCODONE BITARTRATE AND ACETAMINOPHEN 5; 325 MG/1; MG/1
1 TABLET ORAL ONCE AS NEEDED
Status: DISCONTINUED | OUTPATIENT
Start: 2025-06-13 | End: 2025-06-13

## 2025-06-13 RX ORDER — HYDROMORPHONE HYDROCHLORIDE 1 MG/ML
0.4 INJECTION, SOLUTION INTRAMUSCULAR; INTRAVENOUS; SUBCUTANEOUS EVERY 5 MIN PRN
Status: DISCONTINUED | OUTPATIENT
Start: 2025-06-13 | End: 2025-06-13

## 2025-06-13 RX ORDER — MIDAZOLAM HYDROCHLORIDE 1 MG/ML
INJECTION INTRAMUSCULAR; INTRAVENOUS AS NEEDED
Status: DISCONTINUED | OUTPATIENT
Start: 2025-06-13 | End: 2025-06-13 | Stop reason: SURG

## 2025-06-13 RX ORDER — PROCHLORPERAZINE EDISYLATE 5 MG/ML
5 INJECTION INTRAMUSCULAR; INTRAVENOUS EVERY 8 HOURS PRN
Status: DISCONTINUED | OUTPATIENT
Start: 2025-06-13 | End: 2025-06-13

## 2025-06-13 RX ORDER — METOCLOPRAMIDE HYDROCHLORIDE 5 MG/ML
INJECTION INTRAMUSCULAR; INTRAVENOUS AS NEEDED
Status: DISCONTINUED | OUTPATIENT
Start: 2025-06-13 | End: 2025-06-13 | Stop reason: SURG

## 2025-06-13 RX ORDER — ACETAMINOPHEN 500 MG
1000 TABLET ORAL ONCE
Status: DISCONTINUED | OUTPATIENT
Start: 2025-06-13 | End: 2025-06-13

## 2025-06-13 RX ORDER — SCOPOLAMINE 1 MG/3D
1 PATCH, EXTENDED RELEASE TRANSDERMAL ONCE
Status: DISCONTINUED | OUTPATIENT
Start: 2025-06-13 | End: 2025-06-13

## 2025-06-13 RX ORDER — ACETAMINOPHEN 500 MG
1000 TABLET ORAL ONCE AS NEEDED
Status: DISCONTINUED | OUTPATIENT
Start: 2025-06-13 | End: 2025-06-13

## 2025-06-13 RX ORDER — HYDROCODONE BITARTRATE AND ACETAMINOPHEN 5; 325 MG/1; MG/1
2 TABLET ORAL ONCE AS NEEDED
Status: DISCONTINUED | OUTPATIENT
Start: 2025-06-13 | End: 2025-06-13

## 2025-06-13 RX ORDER — ONDANSETRON 2 MG/ML
INJECTION INTRAMUSCULAR; INTRAVENOUS AS NEEDED
Status: DISCONTINUED | OUTPATIENT
Start: 2025-06-13 | End: 2025-06-13 | Stop reason: SURG

## 2025-06-13 RX ADMIN — ONDANSETRON 4 MG: 2 INJECTION INTRAMUSCULAR; INTRAVENOUS at 10:52:00

## 2025-06-13 RX ADMIN — KETOROLAC TROMETHAMINE 30 MG: 30 INJECTION, SOLUTION INTRAMUSCULAR; INTRAVENOUS at 11:07:00

## 2025-06-13 RX ADMIN — METOCLOPRAMIDE HYDROCHLORIDE 10 MG: 5 INJECTION INTRAMUSCULAR; INTRAVENOUS at 10:52:00

## 2025-06-13 RX ADMIN — MIDAZOLAM HYDROCHLORIDE 2 MG: 1 INJECTION INTRAMUSCULAR; INTRAVENOUS at 10:52:00

## 2025-06-13 RX ADMIN — LIDOCAINE HYDROCHLORIDE 50 MG: 10 INJECTION, SOLUTION EPIDURAL; INFILTRATION; INTRACAUDAL; PERINEURAL at 10:57:00

## 2025-06-13 RX ADMIN — SODIUM CHLORIDE, SODIUM LACTATE, POTASSIUM CHLORIDE, CALCIUM CHLORIDE: 600; 310; 30; 20 INJECTION, SOLUTION INTRAVENOUS at 10:50:00

## 2025-06-13 NOTE — H&P
[facility]  Obstetrics and Gynecology  History & Physical    Kateryna Han Patient Status:  Hospital Outpatient Surgery    3/19/1970 MRN [id number]   Location [facility] SURGERY Attending Alessia Pizano MD   Hospital Day 0 PCP Valerie Galdamez MD     CC: Patient is here for dilation, hysteroscopy, hysteroscopic polypectomy, hysteroscopic directed endometrial sampling and possible curettage     SUBJECTIVE:    Kateryna Han is a 55 year old  female who presents for dilation, hysteroscopy, hysteroscopic polypectomy, hysteroscopic directed endometrial sampling and possible curettage 2/2 PMB and thickened endometrium. The patient reports  she had PMB for 2 days and most was on  with light spotting on 4/15/2025. Menopause since . She reports increased lower abdominal pain and was given Norco 2/2 pain.  The patient was seen in the emergency room on 2025 secondary to right lower quadrant pain.  Her CT abdomen/pelvis was noted for uterus with fibroid.  The patient had a pelvic ultrasound on 2025.  Her pelvic ultrasound was noted for thickened endometrium and a small uterine fibroid. A discussion was held with the patient regarding findings and recommendations. The patient was offered surgical management. The patient was agreeable with recommendations and presents today for scheduled procedure.     Obstetric History:   OB History    Para Term  AB Living   4 3 3 0 1 3   SAB IAB Ectopic Multiple Live Births   1 0 0 0 3      # Outcome Date GA Lbr [facility]/2nd Weight Sex Type Anes PTL Lv   4 SAB 10/2011 8w0d          3 Term 05 40w0d  7 lb 8 oz (3.402 kg) M NORMAL SPONT   KASEY   2 Term 96 38w0d  7 lb 14.1 oz (3.575 kg) M Caesarean   KASEY   1 Term 93 40w0d  7 lb 13.2 oz (3.55 kg) F NORMAL SPONT   KASEY     Past Medical History:   Past Medical History:    Anemia    Esophageal reflux    Essential hypertension    Fatigue    Fibroids    Fibromyalgia     Flatulence/gas pain/belching    H. pylori infection    on EGD    Headache disorder    High blood pressure    HX OF, NO MEDICATIONS/TREATMENT    High cholesterol    History of gestational diabetes    Humeral distal fracture    L    IFG (impaired fasting glucose)    Leg swelling    Migraines    Mixed hyperlipidemia    Pain with bowel movements    Sleep disturbance    Visual impairment    GLASSES FOR DISTANCE    Wears glasses    Weight loss     Past Surgical History:   Past Surgical History:   Procedure Laterality Date    Cholecystectomy      Colonoscopy  2024    int hemorrhoid, repeat 10 yrs, Dr. Lewis, [facility]    Eeh amb cologuard (results only)      Egd  2024    normal, Dr. Lewis, [facility]    Endometrial biopsy - jar(s): 2  2024    benign, Eleanor Gall    Orif humerus fracture Left     Prior classical        Family History:   Family History   Problem Relation Age of Onset    Stroke Mother     Hypertension Mother     Seizure Disorder Mother     High Cholesterol Mother     Heart Attack Father     Heart Disorder Father         CABG    Diabetes Father     Hypertension Father     Heart Disorder Sister 12    Hypertension Brother     Hypertension Brother     Other (Other) Brother         pancreatitis    Hypertension Maternal Grandmother     No Known Problems Maternal Grandfather     Heart Attack Paternal Grandmother     Pancreatic Cancer Paternal Grandfather     Breast Cancer Neg     Colon Cancer Neg     Ovarian Cancer Neg      Social History:   Social History     Tobacco Use    Smoking status: Never    Smokeless tobacco: Never   Substance Use Topics    Alcohol use: Yes     Comment: wine on special occasions few times a yr, never a problem       Home Meds:   No medications prior to admission.     Allergies:   Allergies   Allergen Reactions    Dipyrone HIVES, RASH and UNKNOWN     A pain med (not an opioid or nsaid)    Other RASH     COPPER IUD CAUSED RASH       OBJECTIVE:       Body mass  index is 30.18 kg/m².    General: AAO. NAD.   Lungs: CTAB.   CV: RRR.  Abdomen: soft, nontender, nondistended, +BS  Gu: deferred to OR  Extremities: negative edema bilaterally, negative calf tenderness bilaterally, Jose's sign negative bilaterally     Labs:  Component  Ref Range & Units (hover) 25  9:52 AM   WBC 6.1   RBC 4.67   HGB 13.7   HCT 41.2   .0   MCV 88.2   MCH 29.3   MCHC 33.3   RDW 12.5   Resulting [facility]             Specimen Collected: 25  9:52 AM Last Resulted: 25 12:37 PM       Imaging:  FINDINGS:                UTERUS:  10.4 x 3.8 x 5.4 cm    Endometrium thickness:  17 mm    2.4 x 1.4 x 2.5 cm transmural fibroid of anterior fundus.  RIGHT OVARY:  Not visualized due to bowel gas.  LEFT OVARY:  2.5 x 1.4 x 2.7 cm.    The left ovary appears normal in size, shape, and echogenicity. No significant masses are identified.  CUL-DE-SAC:  Normal.  No fluid or mass.    OTHER:  Negative.                    Impression  CONCLUSION:    1. Thickened endometrium again demonstrated without significant change.  Differential considerations include endometrial neoplasm and hyperplasia.  Given the history of postmenopausal bleeding, endometrial tissue sampling is recommended.  2. Small uterine fibroid.        LOCATION:  [place]        Dictated by (CST): Clay Bright MD on 2025 at 12:42 PM      Finalized by (CST): Clay Bright MD on 2025 at 12:45 PM            ASSESSMENT/ PLAN:    Kateryna Han is a 55 year old  female who presents for dilation, hysteroscopy, hysteroscopic polypectomy, hysteroscopic directed endometrial sampling and possible curettage      Problem List Items Addressed This Visit          Genitourinary and Reproductive    Postmenopausal bleeding - Primary    Relevant Orders    CBC, Platelet; No Differential (Completed)     Other Visit Diagnoses         Pre-op testing        Relevant Orders    CBC, Platelet; No Differential (Completed)            -  Admit for outpatient surgical procedure   - IVF: LR @ 100 cc/hr   - Diet: NPO  - Meds: none  - VTE prophylaxis: SCDs, per protocol   - Anesthesia to evaluate   - Consent obtained and placed in chart       Risks, benefits, alternatives and possible complications have been discussed in detail with the patient.  Pre-admission, admission, and post admission procedures and expectations were discussed in detail.  All questions answered, all appropriate consents will be signed at the Hospital. Previously stated procedure is planned for today and anticipated    Alessia Pizano MD   [facility] - OBGYN          Note to patient and family   The 21st Century Cures Act makes medical notes available to patients in the interest of transparency.  However, please be advised that this is a medical document.  It is intended as pver-sv-frky communication.  It is written and medical language may contain abbreviations or verbiage that are technical and unfamiliar.  It may appear blunt or direct.  Medical documents are intended to carry relevant information, facts as evident, and the clinical opinion of the practitioner.

## 2025-06-13 NOTE — ANESTHESIA PREPROCEDURE EVALUATION
PRE-OP EVALUATION    Patient Name: Kateryna Han    Admit Diagnosis: Postmenopausal bleeding [N95.0]  Thickened endometrium [R93.89]    Pre-op Diagnosis: Postmenopausal bleeding [N95.0]  Thickened endometrium [R93.89]    TRUCLEAR HYSTEROSCOPY, dilation and curettage, hysteroscopic endometrial sampling    Anesthesia Procedure: TRUCLEAR HYSTEROSCOPY, dilation and curettage, hysteroscopic endometrial sampling    Surgeons and Role:     * Alessia Pizano MD - Primary    Pre-op vitals reviewed.  Temp: 97.7 °F (36.5 °C)  Pulse: 50  Resp: 16  BP: 134/88  SpO2: 98 %  Body mass index is 30.36 kg/m².    Current medications reviewed.  Hospital Medications:  Current Medications[1]    Outpatient Medications:   Prescriptions Prior to Admission[2]    Allergies: Dipyrone and Other      Anesthesia Evaluation    Patient summary reviewed.    Anesthetic Complications  (-) history of anesthetic complications         GI/Hepatic/Renal      (+) GERD and well controlled                          Cardiovascular        Exercise tolerance: good     MET: >4      (+) hypertension (resolved, no meds)                                     Endo/Other    Negative endo/other ROS.           (-) anemia                   Pulmonary    Negative pulmonary ROS.             (-) recent URI   (-) sleep apnea       Neuro/Psych    Negative neuro/psych ROS.                                  Past Surgical History[3]  Social Hx on file[4]  History   Drug Use No     Available pre-op labs reviewed.  Lab Results   Component Value Date    WBC 6.1 06/11/2025    RBC 4.67 06/11/2025    HGB 13.7 06/11/2025    HCT 41.2 06/11/2025    MCV 88.2 06/11/2025    MCH 29.3 06/11/2025    MCHC 33.3 06/11/2025    RDW 12.5 06/11/2025    .0 06/11/2025     Lab Results   Component Value Date     04/29/2025    K 3.9 04/29/2025     04/29/2025    CO2 30.0 04/29/2025    BUN 17 04/29/2025    CREATSERUM 0.70 04/29/2025    GLU 90 04/29/2025    CA 9.4 04/29/2025             Airway      Mallampati: II  Mouth opening: 3 FB  TM distance: 4 - 6 cm  Neck ROM: full Cardiovascular    Cardiovascular exam normal.  Rhythm: regular  Rate: normal  (-) murmur   Dental    Dentition appears grossly intact         Pulmonary    Pulmonary exam normal.  Breath sounds clear to auscultation bilaterally.        (-) wheezes       Other findings              ASA: 1   Plan: MAC  NPO status verified and patient meets guidelines.          Plan/risks discussed with: patient              We discussed MAC anesthesia/sedation with GA as back up.  We discussed the goal of safe and acceptable sedation that may have interlude(s) of consciousness.  We discussed possible need for PONV prophylaxis and analgesic plan as needed.  The patient's questions were answered and consent was attained.            [1]    [Transfer Hold] acetaminophen (Tylenol Extra Strength) tab 1,000 mg  1,000 mg Oral Once    [Transfer Hold] scopolamine (Transderm-Scop) 1 MG/3DAYS patch 1 patch  1 patch Transdermal Once    lactated ringers infusion   Intravenous Continuous   [2]   Medications Prior to Admission   Medication Sig Dispense Refill Last Dose/Taking    Turmeric (QC TUMERIC COMPLEX OR) Take by mouth.   Past Week    rosuvastatin 20 MG Oral Tab Take 1 tablet (20 mg total) by mouth nightly. 90 tablet 0 6/9/2025    famotidine 20 MG Oral Tab Take 1 tablet (20 mg total) by mouth 2 (two) times daily as needed for Heartburn. 180 tablet 1 Past Week    Multiple Vitamin (MULTIVITAMIN ADULT OR) Take 1 tablet by mouth in the morning.   Past Week    ibuprofen 600 MG Oral Tab Take 1 tablet (600 mg total) by mouth every 6 (six) hours as needed. (Patient not taking: Reported on 6/10/2025) 30 tablet 3 Not Taking    cyclobenzaprine 5 MG Oral Tab Take 1 tablet (5 mg total) by mouth 2 (two) times daily as needed (uterine cramping). Don't drive if it makes you sleepy. (Patient not taking: Reported on 5/7/2025) 20 tablet 0     HYDROcodone-acetaminophen  5-325 MG Oral Tab Take 1 tablet by mouth 2 (two) times daily as needed (abdominal pain). (Patient not taking: Reported on 6/10/2025) 10 tablet 0 Not Taking    [] HYDROcodone-acetaminophen 5-325 MG Oral Tab Take 1-2 tablets by mouth every 6 (six) hours as needed. (Patient not taking: Reported on 6/10/2025) 10 tablet 0 Not Taking   [3]   Past Surgical History:  Procedure Laterality Date    Cholecystectomy      Colonoscopy  2024    int hemorrhoid, repeat 10 yrs, Dr. Lewis, [facility]    Egd  2024    normal, Dr. Lewis, [facility]    Endometrial biopsy - jar(s): 2  2024    benign, [facility]    Orif humerus fracture Left     Prior classical      [4]   Social History  Socioeconomic History    Marital status:     Number of children: 3   Tobacco Use    Smoking status: Never    Smokeless tobacco: Never   Vaping Use    Vaping status: Never Used   Substance and Sexual Activity    Alcohol use: Yes     Comment: wine on special occasions few times a yr, never a problem    Drug use: No    Sexual activity: Not Currently     Partners: Male     Comment: in menopause   Other Topics Concern    Caffeine Concern No    Exercise Yes     Comment: 3 x a week.walk    Seat Belt Yes

## 2025-06-13 NOTE — DISCHARGE INSTRUCTIONS
Home Care Instructions Following Your Hysteroscopy     Kateryna-  We hope you were pleased with your care at [facility].  We wish you the best outcome and overall experience with your operation.  These instructions will help to minimize pain, limit the risk for an infection, and improve the likelihood of a successful result.    What to Expect:  Expect some vaginal bleeding, watery vaginal discharge, or spotting for 1-2 weeks after your procedure  You might also experience abdominal cramping for 1-2 days  Call the office, if you experience heavy bleeding (saturating a pad every hour)    Over-The-Counter Medication  Non-prescription anti-inflammatory medications can also help to ease the pain.  You can take Aleve, Tylenol or Ibuprofen   Take as directed on the bottle  Drink a full glass of water with the medication    Bathing/Showers  You may resume showers in one day  No baths, swimming, hot tubs for two weeks    Home Medication  Resume your home medications as instructed    Diet  Resume your normal diet    Activity  Refrain from vaginal intercourse, vaginal suppositories, tampon use or douches until your first office appointment with the doctor  No strenuous activity or heavy lifting for two days  You may go up and down the stairs as tolerated    No physical exercise, sports, or gym workouts for two days    Return to Work or School  You may return to work in two days  Contact your gynecologist's office if you need a medical note  You may return to school in two days with no restrictions    Driving  You may resume driving tomorrow    Follow-up Appointment with Your Gynecologist  Call your gynecologist's office today for an appointment in two weeks    The number is [phone]  Verify your appointment date, day, time, and location  At your postoperative office appointment, your progress will be evaluated, findings reviewed, and any additional concerns and instructions will be discussed    Questions or  Concerns  Call the office if you experience severe pain not controlled by pain medication, swelling, heavy bleeding, foul smelling vaginal discharge, shortness of breath, chest pain, leg pain, fever (100.4 or greater), or other concerns  The number is: [phone]  If your call is made after office hours, the physician on call will be available to help you.  There is always a doctor from the group covering our gynecology patients, when your provider is unavailable    Kateryna,  Thank you for coming to [facility] for your operation.  The nurses and the anesthesiologist try very hard to make sure you receive the best care possible.  Your trust in them as well as us is greatly appreciated.  Thanks so much,  The Gynecologists or [facility]    You received a drug called Toradol which is an Anti Inflammatory at: 11:07am  If you are allowed to take Anti inflammatories:    Do not take any Anti Inflammatory like Motrin, Aleve or Ibuprophen until after: 5:07pm  Please report any suspected allergic reactions or bleeding issues to your doctor

## 2025-06-13 NOTE — ANESTHESIA POSTPROCEDURE EVALUATION
[facility] Patient Status:  Hospital Outpatient Surgery   Age/Gender 55 year old female MRN [id number]   Location [facility] SURGERY Attending Alessia Pizano MD   Hosp Day # 0 PCP Valerie Galdamez MD       Anesthesia Post-op Note    TRUCLEAR HYSTEROSCOPY, dilation and curettage, hysteroscopic polypectomy,hysteroscopic endometrial sampling    Procedure Summary       Date: 06/13/25 Room / Location:  MAIN OR 11 / [facility] MAIN OR    Anesthesia Start: 1050 Anesthesia Stop: 1130    Procedure: TRUCLEAR HYSTEROSCOPY, dilation and curettage, hysteroscopic polypectomy,hysteroscopic endometrial sampling Diagnosis:       Postmenopausal bleeding      Thickened endometrium      (Postmenopausal bleeding [N95.0]Thickened endometrium [R93.89])    Surgeons: Alessia Pizano MD Anesthesiologist: Lincoln Reynoso MD    Anesthesia Type: MAC ASA Status: 1            Anesthesia Type: MAC    Vitals Value Taken Time   /65 06/13/25 11:48   Temp 97.4 °F (36.3 °C) 06/13/25 11:31   Pulse 48 06/13/25 11:52   Resp 16 06/13/25 11:46   SpO2 98 % 06/13/25 11:52   Vitals shown include unfiled device data.        Patient Location: Same Day Surgery    Anesthesia Type: MAC    Airway Patency: patent    Postop Pain Control: adequate    Mental Status: preanesthetic baseline    Nausea/Vomiting: none    Cardiopulmonary/Hydration status: stable euvolemic    Complications: no apparent anesthesia related complications    Postop vital signs: stable    Dental Exam: Unchanged from Preop    Patient to be discharged home when criteria met.

## 2025-06-13 NOTE — OPERATIVE REPORT
OPERATIVE REPORT:   DILATATION, HYSTEROSCOPY, HYSTEROSCOPIC POLYPECTOMY, AND HYSTEROSCOPIC ENDOMETRIAL SAMPLING PROCEDURE NOTE    PATIENT: Kateryna Han  MRN: [id number]  DATE OF PROCEDURE: 25    INDICATIONS:    female who presents for dilation, hysteroscopy, hysteroscopic polypectomy, hysteroscopic directed endometrial sampling and possible curettage 2/2 PMB and thickened endometrium. The patient reports  she had PMB for 2 days and most was on  with light spotting on 4/15/2025. Menopause since . She reports increased lower abdominal pain and was given Norco 2/2 pain.  The patient was seen in the emergency room on 2025 secondary to right lower quadrant pain.  Her CT abdomen/pelvis was noted for uterus with fibroid.  The patient had a pelvic ultrasound on 2025.  Her pelvic ultrasound was noted for thickened endometrium and a small uterine fibroid. A discussion was held with the patient regarding findings and recommendations. The patient was offered surgical management. The patient was agreeable with recommendations and presents today for scheduled procedure.     PRE-OP DIAGNOSIS:   PMB  Thickened endometrium      POST-OP DIAGNOSIS:   PMB  Thickened endometrium   Endometrial polyp    PROCEDURE(S):   DILATATION   HYSTEROSCOPY   HYSTEROSCOPIC POLYPECTOMY   HYSTEROSCOPIC ENDOMETRIAL SAMPLING    ANESTHESIA:  MAC    SURGEON(S): Dr. Alessia Pizano MD    ESTIMATED BLOOD LOSS: 2 mL           DRAINS: 25 mL    UTERINE DISTENSION MEDIUM: Normal Saline 0.9%  DEFICIT: 585 mL - confirmed by manual count      SPECIMENS: Endometrial polyp and endometrial sampling            IMPLANTS: None           COMPLICATIONS:  None    FINDINGS: Normal external genitalia, no lesions. Normal multiparous cervix, no lesions. Anteverted uterus. Cervix dilated to 6 mm. The ostia were visualized bilaterally after endometrial polyp removed. Atrophic endometrium. Large polyp noted from right cornua to lower uterine  segment. No fibroids.  Specimens sent to pathology. Good hemostasis.     PROCEDURE: This procedure was fully reviewed with the patient and written informed consent was obtained after discussing risks, benefits, indication and alternatives. All questions were answered.     The patient was taken to operative room and MAC anesthesia was initiated. She was placed in dorsal lithotomy position. She was prepped and draped in normal sterile fashion. The bladder was emptied using a straight catheter. A sterile speculum was placed in the vagina. A single tooth tenaculum was used to grasp the anterior lip of the cervix. The cervix was gently dilated with hegar dilators to 6 mm. After the [place] hysteroscope system was calibrated, the hysteroscope was then gently advanced into the endometrial cavity. The findings are noted above. The TRUCLEAR hysteroscopic rotary blade was then advanced through the hysteroscope under direct visualization. The window lock was set to minimize fluid use and maintain uterine distention. The rotary blade was placed against the endometrial polyp tissue and the system was activated by stepping on one pedal. The TRUCLEAR hysteroscopic rotary blade simultaneously aspirated and cut the endometrial polyp tissue without any complications. However, dense areas noted therefore the rotatory blade was changed to the dense tissue mini and removal of entire polyp was achieved. This specimen was collected and sent as separate sample. The dense tissue mini was then removed and replaced with the rotary blade again. This was placed against the endometrial tissue. This was repeated along the anterior, posterior and lateral surface of the endometrial cavity for a thorough sampling of the endometrium. This tissue was collected and sent to pathology as a separate sample.      The TRUCLEAR hysteroscopic rotary blade and hysteroscope were then removed from the uterus. The single tooth tenaculum was removed and good  hemostasis was noted. Rings forceps were placed along the previous tenaculum site for pressure. Good hemostasis noted. Sponge, lap, needle, and instrument counts correct by two counts. The patient was taken to recovery room in stable condition. She was instructed to avoid anything in vagina for two weeks.       DISPOSITION:  To recovery room in stable condition        CONDITION: Stable        Note to patient and family   The 21st Century Cures Act makes medical notes available to patients in the interest of transparency.  However, please be advised that this is a medical document.  It is intended as stwl-wg-pavs communication.  It is written and medical language may contain abbreviations or verbiage that are technical and unfamiliar.  It may appear blunt or direct.  Medical documents are intended to carry relevant information, facts as evident, and the clinical opinion of the practitioner.          Alessia Pizano MD   [facility] - OBGYN

## 2025-07-03 ENCOUNTER — OFFICE VISIT (OUTPATIENT)
Dept: OBGYN CLINIC | Facility: CLINIC | Age: 55
End: 2025-07-03
Payer: COMMERCIAL

## 2025-07-03 VITALS
HEART RATE: 75 BPM | WEIGHT: 165 LBS | BODY MASS INDEX: 30.36 KG/M2 | DIASTOLIC BLOOD PRESSURE: 84 MMHG | HEIGHT: 62 IN | SYSTOLIC BLOOD PRESSURE: 132 MMHG

## 2025-07-03 DIAGNOSIS — N95.0 PMB (POSTMENOPAUSAL BLEEDING): Primary | ICD-10-CM

## 2025-07-03 DIAGNOSIS — N95.2 VAGINAL ATROPHY: ICD-10-CM

## 2025-07-03 DIAGNOSIS — Z98.890 STATUS POST HYSTEROSCOPY: ICD-10-CM

## 2025-07-03 PROCEDURE — 99214 OFFICE O/P EST MOD 30 MIN: CPT | Performed by: OBSTETRICS & GYNECOLOGY

## 2025-07-03 RX ORDER — ESTRADIOL 0.1 MG/G
0.5 CREAM VAGINAL
Qty: 42.5 G | Refills: 1 | Status: SHIPPED | OUTPATIENT
Start: 2025-07-03

## 2025-07-03 NOTE — PROGRESS NOTES
[facility]  Obstetrics and Gynecology  Follow Up Progress Note    Subjective:     Kateryna Han is a 55 year old  female who is s/p dilation, hysteroscopy, hysteroscopic polypectomy, hysteroscopic directed endometrial sampling 2/ PMB and thickened endometrium on 25. The patient was recommended to return for follow up. The patient reports doing well.  The patient reports that she does have some back pain that radiates down her legs with ambulation.  She has had this discomfort even prior to her surgery.  The patient states that she will have episodes of epigastric pain that causes abdominal tightening that can also lead to the onset of her back pain as well.    Review of Systems:  General:  denies fevers, chills, fatigue and malaise.   Respiratory:  denies SOB, dyspnea, cough or wheezing  Cardiovascular:  denies chest pain, palpitations, exercise intolerance   GI: denies abdominal pain, diarrhea, constipation  :  denies dysuria, hematuria, increased urinary frequency. denies abnormal vaginal bleeding or vaginal discharge.       Objective:     Vitals:    25 1353   BP: 132/84   Pulse: 75   Weight: 165 lb (74.8 kg)   Height: 62\"         Body mass index is 30.18 kg/m².    General: AAO.NAD.   CVS exam: normal peripheral perfusion   Chest: no tachypnea, retractions or cyanosis   Abdominal exam: soft, nontender, nondistended, no masses or organomegaly  Pelvic exam:   VULVA: normal appearing vulva with no masses, tenderness or lesions  PERINEUM:  normal appearing, no lesions   URETHRAL MEATUS:  normal appearing, no lesions   VAGINA: atrophic appearing vagina with normal color and discharge, no lesions. Tender with palpation.   CERVIX: normal appearing cervix without discharge or lesions  UTERUS: uterus is normal size, shape, consistency and nontender  ADNEXA: normal adnexa in size, nontender and no masses  PERIRECTAL:  normal appearing, no lesions   Ext: non-tender, no edema     Pathology    Final Diagnosis:  A.  Endometrial polypectomy:  - Fragments of benign endometrial polyp.  - Fragments of underlying benign smooth muscle.  - Negative for hyperplasia or malignancy.     B.  Endometrial sampling:  - Fragments of inactive endometrium.  - Fragments of underlying benign smooth muscle.  - Negative for hyperplasia or malignancy.  Electronically signed by Cathryn A Goldberg, MD on 2025 at 1137 CDT  Clinical Information  N95.0 Postmenopausal Bleeding.  R93.89 Thickened Endometrium.     Gross Description  A. Labeled with the patient's name, MRN, and \"endometrial polyp\"  Received in formalin: Multiple soft, pink-tan tissue fragments measuring 8.1 x 3.1 x 2.9 cm in aggregate.  The specimen is submitted entirely in A1-A6.     B. Labeled with the patient's name, MRN, and \"endometrial sampling\"  Received in formalin: Multiple soft, pink-tan tissue fragments measuring 1.9 x 0.8 x 0.2 cm in aggregate.  The specimen is submitted entirely in B1.     (RADHA)  [facility] (Formerly [facility])        Assessment:     Kateryna Han is a 55 year old  female who is s/p dilation, hysteroscopy, hysteroscopic polypectomy, hysteroscopic directed endometrial sampling who presents for follow up     Patient Active Problem List   Diagnosis    Fibroids    Essential hypertension    Fibromyalgia    Mixed hyperlipidemia    Chronic migraine without aura    Depressive disorder    Insomnia    Vitreous degeneration    Round hole of retina without detachment    Retinal lattice degeneration    IFG (impaired fasting glucose)    Vitamin deficiency    Obesity    Generalized anxiety disorder    Excessive and frequent menstruation with regular cycle    Conversion disorder    Iron deficiency    Presbyopia    Myopia of both eyes    Postmenopausal bleeding    Thickened endometrium    Endometriosis    PMB (postmenopausal bleeding)         Plan:     Post operative exam   - doing well post operatively   - physical exam within normal limits    - may return to normal activity   - reviewed and d/w patient pathology results  PMB  - s/p dilation, hysteroscopy, hysteroscopic polypectomy, hysteroscopic directed endometrial sampling  - Benign pathology noted that confirms endometrial polyp  - Precautions provided  Vaginal atrophy  -Noted on exam  -Recommend vaginal estrogen cream.  New prescription with instructions provided.  -Precautions provided  Back pain  - Radiating towards her legs  - Discussed with patient that her symptoms might be musculoskeletal versus neurological versus arthritic  - Therefore, recommend for patient to follow-up with her PCP.    All of the findings and plan were discussed with the patient.  She notes understanding and agrees with the plan of care.  All questions were answered to the best of my ability at this time.    Total patient time was 30 minutes in evaluation, consultation, and coordination of care. This included face to face and non-face to face actions. The patient's questions and concerns were addressed.     RTC in 3 months for well woman exam or sooner if needed     Alessia Pizano MD   [facility] - OBGYN     Discussed with patient that there will not be further notification of normal or benign results other than receiving results on [facility]. A Digital [employer] message or telephone call will be placed by the physician and/or office staff if results are abnormal.     Note to patient and family   The 21st Century Cures Act makes medical notes available to patients in the interest of transparency.  However, please be advised that this is a medical document.  It is intended as ikyu-pt-eogq communication.  It is written and medical language may contain abbreviations or verbiage that are technical and unfamiliar.  It may appear blunt or direct.  Medical documents are intended to carry relevant information, facts as evident, and the clinical opinion of the practitioner.        This note could include assistance by Dragon voice recognition.  Errors in content may be related to improper recognition by the system; efforts to review and correct have been done but errors may still exist.

## 2025-08-04 DIAGNOSIS — E78.00 ELEVATED LDL CHOLESTEROL LEVEL: ICD-10-CM

## 2025-08-04 DIAGNOSIS — E78.00 ELEVATED CHOLESTEROL: ICD-10-CM

## 2025-08-04 RX ORDER — ROSUVASTATIN CALCIUM 20 MG/1
20 TABLET, COATED ORAL NIGHTLY
Qty: 90 TABLET | Refills: 0 | Status: SHIPPED | OUTPATIENT
Start: 2025-08-04

## 2025-08-14 ENCOUNTER — LAB ENCOUNTER (OUTPATIENT)
Dept: LAB | Age: 55
End: 2025-08-14
Attending: FAMILY MEDICINE

## 2025-08-14 LAB
ALBUMIN SERPL-MCNC: 4.6 G/DL (ref 3.2–4.8)
ALBUMIN/GLOB SERPL: 1.4 (ref 1–2)
ALP LIVER SERPL-CCNC: 73 U/L (ref 41–108)
ALT SERPL-CCNC: 38 U/L (ref 10–49)
ANION GAP SERPL CALC-SCNC: 8 MMOL/L (ref 0–18)
AST SERPL-CCNC: 23 U/L (ref ?–34)
BILIRUB SERPL-MCNC: 0.6 MG/DL (ref 0.3–1.2)
BUN BLD-MCNC: 14 MG/DL (ref 9–23)
CALCIUM BLD-MCNC: 9.2 MG/DL (ref 8.7–10.6)
CHLORIDE SERPL-SCNC: 105 MMOL/L (ref 98–112)
CHOLEST SERPL-MCNC: 208 MG/DL (ref ?–200)
CO2 SERPL-SCNC: 29 MMOL/L (ref 21–32)
CREAT BLD-MCNC: 0.86 MG/DL (ref 0.55–1.02)
EGFRCR SERPLBLD CKD-EPI 2021: 80 ML/MIN/1.73M2 (ref 60–?)
FASTING PATIENT LIPID ANSWER: YES
FASTING STATUS PATIENT QL REPORTED: YES
GLOBULIN PLAS-MCNC: 3.2 G/DL (ref 2–3.5)
GLUCOSE BLD-MCNC: 129 MG/DL (ref 70–99)
HDLC SERPL-MCNC: 75 MG/DL (ref 40–59)
LDLC SERPL CALC-MCNC: 113 MG/DL (ref ?–100)
NONHDLC SERPL-MCNC: 133 MG/DL (ref ?–130)
OSMOLALITY SERPL CALC.SUM OF ELEC: 296 MOSM/KG (ref 275–295)
POTASSIUM SERPL-SCNC: 4.1 MMOL/L (ref 3.5–5.1)
PROT SERPL-MCNC: 7.8 G/DL (ref 5.7–8.2)
SODIUM SERPL-SCNC: 142 MMOL/L (ref 136–145)
TRIGL SERPL-MCNC: 117 MG/DL (ref 30–149)
TROPONIN I SERPL HS-MCNC: <3 NG/L (ref ?–34)
TSI SER-ACNC: 1.22 UIU/ML (ref 0.55–4.78)
VLDLC SERPL CALC-MCNC: 20 MG/DL (ref 0–30)

## 2025-08-14 PROCEDURE — 84443 ASSAY THYROID STIM HORMONE: CPT | Performed by: FAMILY MEDICINE

## 2025-08-14 PROCEDURE — 36415 COLL VENOUS BLD VENIPUNCTURE: CPT | Performed by: FAMILY MEDICINE

## 2025-08-14 PROCEDURE — 84484 ASSAY OF TROPONIN QUANT: CPT | Performed by: FAMILY MEDICINE

## 2025-08-14 PROCEDURE — 80061 LIPID PANEL: CPT | Performed by: FAMILY MEDICINE

## 2025-08-14 PROCEDURE — 80053 COMPREHEN METABOLIC PANEL: CPT | Performed by: FAMILY MEDICINE

## 2025-08-18 ENCOUNTER — OFFICE VISIT (OUTPATIENT)
Dept: FAMILY MEDICINE CLINIC | Facility: CLINIC | Age: 55
End: 2025-08-18

## 2025-08-18 VITALS
TEMPERATURE: 98 F | WEIGHT: 168 LBS | BODY MASS INDEX: 30.91 KG/M2 | OXYGEN SATURATION: 98 % | DIASTOLIC BLOOD PRESSURE: 74 MMHG | SYSTOLIC BLOOD PRESSURE: 124 MMHG | HEART RATE: 76 BPM | HEIGHT: 62 IN | RESPIRATION RATE: 16 BRPM

## 2025-08-18 DIAGNOSIS — E78.2 MIXED HYPERLIPIDEMIA: Primary | ICD-10-CM

## 2025-08-18 DIAGNOSIS — G89.29 CHRONIC CHEST WALL PAIN: ICD-10-CM

## 2025-08-18 DIAGNOSIS — R07.89 CHRONIC CHEST WALL PAIN: ICD-10-CM

## 2025-08-18 PROCEDURE — 99213 OFFICE O/P EST LOW 20 MIN: CPT | Performed by: FAMILY MEDICINE

## 2025-08-18 RX ORDER — ROSUVASTATIN CALCIUM 20 MG/1
20 TABLET, COATED ORAL NIGHTLY
Qty: 90 TABLET | Refills: 3 | Status: SHIPPED | OUTPATIENT
Start: 2025-08-18

## (undated) DEVICE — GLOVE SUR 6.5 SENSICARE PI PIP GRN PWD F

## (undated) DEVICE — SPECIMEN SOCK - STANDARD: Brand: MEDI-VAC

## (undated) DEVICE — SKN PREP SPNG STKS PVP PNT STR: Brand: MEDLINE INDUSTRIES, INC.

## (undated) DEVICE — DENSE TISSUE SHAVER MINI: Brand: TRUCLEAR

## (undated) DEVICE — CATHETER,URETHRAL,REDRUBBER,STRL,16FR: Brand: MEDLINE

## (undated) DEVICE — SOFT TISSUE SHAVER MINI: Brand: TRUCLEAR

## (undated) DEVICE — 2000CC GUARDIAN II: Brand: GUARDIAN

## (undated) DEVICE — SOLUTION IRRIG 3000ML 0.9% NACL FLX CONT

## (undated) DEVICE — SOLUTION IRRIG 1000ML 0.9% NACL USP BTL

## (undated) DEVICE — HYSTEROSCOPIC OUTFLOW TUBE SET

## (undated) DEVICE — MEDI-VAC NON-CONDUCTIVE SUCTION TUBING: Brand: CARDINAL HEALTH

## (undated) DEVICE — PACK GYNE CUSTOM

## (undated) DEVICE — SLEEVE COMPR MD KNEE LEN SGL USE KENDALL SCD

## (undated) DEVICE — ELITE HYSTEROSCOPE SEAL: Brand: TRUCLEAR

## (undated) DEVICE — SET TB INFLO FOR TRUCLEAR SYS HYSTEROLUX

## (undated) NOTE — MR AVS SNAPSHOT
After Visit Summary   8/25/2021    Cody Staton    MRN: [id number]           Visit Information     Date & Time  8/25/2021  9:30 AM Provider  DO Nat Perez  [facility] [address], [address] [address] [facility].  Phone  [phone] ZOSTER VACC RECOMBINANT IM NJX [id number] CPT(R)]     Future Labs/Procedures Expected by Expires    HPV HIGH RISK , THIN PREP COLLECTION [[id number] CUSTOM]  8/25/2021 8/25/2022    THINPREP PAP SMEAR B [[id number] CUSTOM]  8/25/2021 8/25/2022      Future Appointmen women with prediabetes Every year   Alcohol misuse All women in this age group At routine exams   Blood pressure All women in this age group Yearly checkup if your blood pressure is normal  Normal blood pressure is less than 120/80 mm Hg  If your blood pre Syphilis Women at increased risk for infection – talk with your healthcare provider At routine exams   Tuberculosis Women at increased risk for infection – talk with your healthcare provider Ask your healthcare provider   Vision All women in this age group it How often   BRCA gene mutation testing for breast and ovarian cancer susceptibility Women with increased risk for having gene mutation When your risk is known   Breast cancer and chemoprevention Women at high risk for breast cancer When your risk is kno computer   using [address] with a [facility] Physician or [employer] online. The physician will respond and provide   a treatment plan within a few hours.  ONLINE VISIT  Primary Care Providers  Treatment for mild illness or injury that does not re

## (undated) NOTE — LETTER
Kateryna Han, :3/19/1970    CONSENT FOR PROCEDURE/SEDATION    1. I authorize the performance upon Kateryna Han  the following: Endometrial biopsy procedure    2. I authorize TARIQ Henderson (and whomever is designated as the doctor’s assistant), to perform the above-mentioned procedures.    3. If any unforeseen conditions arise during this procedure calling for additional  procedures, operations, or medications (including anesthesia and blood transfusion), I further request and authorize the doctor to do whatever he/she deems advisable in my interest.    4. I consent to the taking and reproduction of any photographs in the course of this procedure for professional purposes.    5. I consent to the administration of such sedation as may be considered necessary or advisable by the physician responsible for this service, with the exception of ______________________________________________________    6. I have been informed by my doctor of the nature and purpose of this procedure sedation, possible alternative methods of treatment, risk involved and possible complications.    7. If I have a Do Not Resuscitate (DNR) order in place, the physician and I (or the individual authorized to consent on my behalf) will discuss and agree as to whether the Do Not Resuscitate (DNR) order will remain in effect or will be discontinued during the performance of the procedure and the applicable recovery period. If the Do Not Resuscitate (DNR) order is discontinued and is to be reinstated following the procedure/recovery period, the physician will determine when the applicable recovery period ends for purposes of reinstating the Do Not Resuscitate (DNR) order.    Signature of Patient:_______________________________________________    Signature of person authorized to consent for patient:  _______________________________________________________________    Relationship to patient:  ____________________________________________    Witness: _________________________________________ Date:___________     Physician Signature: _______________________________ Date:___________